# Patient Record
Sex: FEMALE | Race: WHITE | NOT HISPANIC OR LATINO | Employment: FULL TIME | URBAN - METROPOLITAN AREA
[De-identification: names, ages, dates, MRNs, and addresses within clinical notes are randomized per-mention and may not be internally consistent; named-entity substitution may affect disease eponyms.]

---

## 2021-06-17 ENCOUNTER — TELEPHONE (OUTPATIENT)
Dept: GASTROENTEROLOGY | Facility: CLINIC | Age: 66
End: 2021-06-17

## 2021-06-17 NOTE — TELEPHONE ENCOUNTER
When did symptoms start? About a year ago  Hx of GERD and stomach ulcers and c diff  Location of abdominal pain -   Center of abdomen and has serious HB every day                                    Quality - sharp, burning, dull, aching, cramping   - constant - knows it is there but can't really describe   actaully feels like someone is Squeezing my stomach    Severity - most days mild during day and gets worse toward night time  Pain Scale   -   6/10  Does pain occur before or after eating?  both  Does pain occur before or after BM?  no  Does pain occur evening before sleep? yes  Morning when waking? yes  Nocturnal - wakes you up at night after falling asleep? Sometimes    Does anything make pain better or worse? TUMS  Any specific triggers? Coffee does make it worse    Does palpation make pain worse? no  Abdominal distention?    no  Excessive flatus? yes  Stool consistency? Coughed the other day and stool poured out of her  Constipation or diarrhea?    both  Mucous in stool or blood in stool? No   Any  issues - dysuria, freqency, hematuria? no  Dietary habits - fluid intake probably okay,not sure about fiber    Current medications and effectiveness  TUMS, Pepcid one dose in morning  -- helpful but does not take symptoms away     Any other signs of illness -      Recent sick contacts   -   Works in                                              hospital      Nausea     sometimes     Vomiting    no     Fever    no     Chills     no     Pharyngitis/rash/fatigue    no         Pt aware she will not get call back until tomorrow

## 2021-06-17 NOTE — TELEPHONE ENCOUNTER
Spoke with patient who advises that she stomach pain daily  States PCP said she needed EGD at the beginning of the year  She was told by our office that she needed appt first which was scheduled in March and pt cancelled  She wants appt  I advised I would call PCP office to get recommendations from them before proceeding  I called PCP office and had to leave message

## 2021-06-17 NOTE — TELEPHONE ENCOUNTER
Last OV  10/2/2017  Last Colonoscopy  06/15/2018  Last Egd  10/17/2017    Pt had been referred by PCP for px EGD in March and had OV scheduled but had to cancel appt  States she is having pain and needs appt      [phone]

## 2021-06-17 NOTE — TELEPHONE ENCOUNTER
Received PCP note from 3/4/2021  Seen for chronic epigastric pain, burning worse over last six months

## 2021-06-18 NOTE — TELEPHONE ENCOUNTER
Spoke to patient, advised anti reflux diet and increase pepcid to 20mg BID for daily heatburn symptoms  She did not want PPI and also has history of C diff  She only having 1 stool a day which is loose no blood

## 2021-07-08 ENCOUNTER — PREP FOR PROCEDURE (OUTPATIENT)
Dept: GASTROENTEROLOGY | Facility: CLINIC | Age: 66
End: 2021-07-08

## 2021-07-08 ENCOUNTER — TELEPHONE (OUTPATIENT)
Dept: GASTROENTEROLOGY | Facility: CLINIC | Age: 66
End: 2021-07-08

## 2021-07-08 ENCOUNTER — OFFICE VISIT (OUTPATIENT)
Dept: GASTROENTEROLOGY | Facility: CLINIC | Age: 66
End: 2021-07-08
Payer: COMMERCIAL

## 2021-07-08 VITALS
SYSTOLIC BLOOD PRESSURE: 110 MMHG | DIASTOLIC BLOOD PRESSURE: 78 MMHG | HEART RATE: 53 BPM | HEIGHT: 64 IN | BODY MASS INDEX: 30.9 KG/M2 | WEIGHT: 181 LBS

## 2021-07-08 DIAGNOSIS — Z87.11 PERSONAL HISTORY OF PEPTIC ULCER DISEASE: ICD-10-CM

## 2021-07-08 DIAGNOSIS — Z86.010 PERSONAL HISTORY OF COLONIC POLYPS: ICD-10-CM

## 2021-07-08 DIAGNOSIS — Z85.3 HISTORY OF BREAST CANCER: ICD-10-CM

## 2021-07-08 DIAGNOSIS — R10.12 LEFT UPPER QUADRANT ABDOMINAL PAIN: ICD-10-CM

## 2021-07-08 DIAGNOSIS — R19.4 CHANGE IN BOWEL HABIT: Primary | ICD-10-CM

## 2021-07-08 DIAGNOSIS — R10.12 LEFT UPPER QUADRANT ABDOMINAL PAIN: Primary | ICD-10-CM

## 2021-07-08 DIAGNOSIS — K21.9 GASTROESOPHAGEAL REFLUX DISEASE WITHOUT ESOPHAGITIS: ICD-10-CM

## 2021-07-08 DIAGNOSIS — R19.4 CHANGE IN BOWEL HABIT: ICD-10-CM

## 2021-07-08 PROCEDURE — 99203 OFFICE O/P NEW LOW 30 MIN: CPT | Performed by: INTERNAL MEDICINE

## 2021-07-08 RX ORDER — PANTOPRAZOLE SODIUM 40 MG/1
40 TABLET, DELAYED RELEASE ORAL DAILY
Qty: 30 TABLET | Refills: 5 | Status: SHIPPED | OUTPATIENT
Start: 2021-07-08 | End: 2021-08-07

## 2021-07-08 RX ORDER — FAMOTIDINE 20 MG/1
20 TABLET, FILM COATED ORAL 2 TIMES DAILY
COMMUNITY

## 2021-07-08 RX ORDER — LETROZOLE 2.5 MG/1
2.5 TABLET, FILM COATED ORAL DAILY
COMMUNITY
Start: 2021-06-30

## 2021-07-08 RX ORDER — PROPRANOLOL HYDROCHLORIDE 120 MG/1
120 CAPSULE, EXTENDED RELEASE ORAL DAILY
COMMUNITY
Start: 2021-06-24

## 2021-07-08 RX ORDER — LISINOPRIL 20 MG/1
20 TABLET ORAL DAILY
COMMUNITY
Start: 2021-06-09

## 2021-07-08 RX ORDER — PAROXETINE HYDROCHLORIDE 20 MG/1
20 TABLET, FILM COATED ORAL
COMMUNITY

## 2021-07-08 NOTE — PROGRESS NOTES
[facility] - Outpatient Consultation  Varun Purdy 77 y o  female MRN: [id number]  Encounter: [id number]    ASSESSMENT AND PLAN:      1  Left upper quadrant abdominal pain  Patient with ongoing left-sided abdominal pain  Primarily in the left upper quadrant but some left lower quadrant pain as well  Patient does have associated nausea seems to be worse with eating  Differential could include peptic ulcer disease, atypical reflux- does report having had previous endoscopy- could consider pancreatic disease, could have functional abdominal pain   EGD at [facility]  - Comprehensive metabolic panel; Future  - CBC and differential; Future  - Occult Blood, Fecal Immunochemical; Future    - if EGD is negative would recommend CT scan abdomen and pelvis oral and IV contrast    2  Personal history of peptic ulcer disease  -- remote history of peptic ulcer disease  Can not exactly remember symptoms at that time she was 19   - if ulcer present should be  Covered with pantoprazole    3  Personal history of colonic polyps  -- did have colonoscopy in 2018 which was negative  Had polyps previously    4  Gastroesophageal reflux disease without esophagitis  -- presently on famotidine  Now advising to take famotidine 40 mg at night and pantoprazole in the morning which should cover her during the day  See if this helps with the pain  - pantoprazole (PROTONIX) 40 mg tablet; Take 1 tablet (40 mg total) by mouth daily  Dispense: 30 tablet; Refill: 5    5  History of breast cancer  --  Right breast diagnosed 2018  Had lumpectomy and lymph node dissection  Positive nodes  Did get radiation therapy no chemo presently on estrogen blockade  Follows with [facility]    6  Change in bowel habit  -- patient does report somewhat more frequent and more she type stools  Does not have any blood    In light of back she had previous polyps and this is a change will investigate  - colonoscopy at [facility]      Followup Appointment: 3 mo  ______________________________________________________________________    Chief Complaint   Patient presents with    Abdominal Pain    Nausea    Diarrhea       HPI:   Yoly Salas is a 77y o  year old female who presents with a history of left sided abdominal pain in the past few months  The pain in primarily located in the left upper quadrant  She has the pain nearly everyday  It may be associated with nausea and seems to be aggravated by food intake  The patient reports that she has been taking famotidine twice per day at this time and developed the pain while she has been on this medication  At times the patient does have some intermittent left sided abdominal pain  This is less troublesome to the patient  She has not had weight loss  She does have a remote history of peptic ulcer disease but she cannot exactly remember what her symptoms were at that time  Patient reports a recent change in her bowel habit  Stools are somewhat more frequent and irregular  There is no blood per rectum  She does did have a colonoscopy in 2018 and and no polyps were found at that time  Patient does have a remote history of breast cancer  She is somewhat terrified that she " has cancer in my body "  She had a lumpectomy and radiation therapy for node positive disease but she declined chemotherapy   She is on hormonal therapy at this time     Historical Information   Past Medical History:   Diagnosis Date    Anxiety     Breast cancer ([address] )     Hyperlipidemia     Hypertension      Past Surgical History:   Procedure Laterality Date    BREAST LUMPECTOMY      CHOLECYSTECTOMY      COLONOSCOPY      ERCP      FOOT SURGERY      LEG SURGERY      TONSILLECTOMY       Social History     Substance and Sexual Activity   Alcohol Use Yes    Comment: rarely     Social History     Substance and Sexual Activity   Drug Use Yes    Types: Marijuana     Social History     Tobacco Use   Smoking Status Former Smoker   Smokeless Tobacco Former User     Family History   Problem Relation Age of Onset    Heart disease Mother     Diabetes Mother     Alcohol abuse Father     Ulcerative colitis Brother     Heart Valve Disease Brother     Heart attack Brother     Clotting disorder Brother     Colon cancer Neg Hx        Meds/Allergies     Current Outpatient Medications:     famotidine (PEPCID) 20 mg tablet    letrozole (FEMARA) 2 5 mg tablet    lisinopril (ZESTRIL) 20 mg tablet    NON FORMULARY    PARoxetine (PAXIL) 20 mg tablet    propranolol (INDERAL LA) 120 mg 24 hr capsule    pantoprazole (PROTONIX) 40 mg tablet    Allergies   Allergen Reactions    Clindamycin Diarrhea     c-diff    Erythromycin Base Other (See Comments)     Abd pain    Metronidazole Swelling    Shellfish Allergy - Food Allergy Vomiting    Amoxicillin Rash       PHYSICAL EXAM:    Blood pressure 110/78, pulse (!) 53, height 5' 3 5" (1 613 m), weight 82 1 kg (181 lb)  Body mass index is 31 56 kg/m²  General Appearance: NAD, cooperative, alert  Eyes: Anicteric, conjunctiva is pink   ENT:  Normocephalic, atraumatic, normal mucosa  Neck:  Supple, symmetrical, trachea midline,   Resp:  Clear to auscultation bilaterally; no rales, rhonchi or wheezing; respirations unlabored   CV:  S1 S2, Regular rate and rhythm; no murmur, rub, or gallop  GI:  Soft, non-tender, non-distended; normal bowel sounds; no masses, no organomegaly   Rectal: Deferred  Musculoskeletal: No cyanosis, clubbing or edema  Normal ROM  Skin:  No jaundice, rashes, or lesions   Heme/Lymph: No palpable cervical lymphadenopathy  Psych: Normal affect, good eye contact  Neuro: No gross deficits, AAOx3      REVIEW OF SYSTEMS:    CONSTITUTIONAL: Denies any fever, chills, rigors, and weight loss  HEENT: No earache or tinnitus  Denies hearing loss or visual disturbances  CARDIOVASCULAR: No chest pain or palpitations     RESPIRATORY: Denies any cough, hemoptysis, shortness of breath or dyspnea on exertion  GASTROINTESTINAL: As noted in the History of Present Illness  GENITOURINARY: No problems with urination  Denies any hematuria or dysuria  NEUROLOGIC: No dizziness or vertigo, denies headaches  MUSCULOSKELETAL: Denies any muscle or joint pain  SKIN: Denies skin rashes or itching  ENDOCRINE: Denies excessive thirst  Denies intolerance to heat or cold  PSYCHOSOCIAL: Positive for some anxiety   Denies any recent memory loss

## 2021-07-08 NOTE — LETTER
July 12, 2021     Demi Leavitt DO  [address] [facility]    [facility] [address]    Patient: Otoniel Torre   YOB: 1955   Date of Visit: 7/8/2021       Dear Dr Robbins: Thank you for referring Otoniel Torre to me for evaluation  Below are my notes for this consultation  If you have questions, please do not hesitate to call me  I look forward to following your patient along with you  Sincerely,        Justyna Shelton MD        CC: No Recipients  Justyna Shelton MD  7/12/2021  8:17 AM  Incomplete    [address] [facility] - [facility]  Otoniel Torre 77 y o  female MRN: [id number]  Encounter: [id number]    ASSESSMENT AND PLAN:      1  Left upper quadrant abdominal pain  Patient with ongoing left-sided abdominal pain  Primarily in the left upper quadrant but some left lower quadrant pain as well  Patient does have associated nausea seems to be worse with eating  Differential could include peptic ulcer disease, atypical reflux- does report having had previous endoscopy- could consider pancreatic disease, could have functional abdominal pain   EGD at [facility]  - Comprehensive metabolic panel; Future  - CBC and differential; Future  - Occult Blood, Fecal Immunochemical; Future    - if EGD is negative would recommend CT scan abdomen and pelvis oral and IV contrast    2  Personal history of peptic ulcer disease  -- remote history of peptic ulcer disease  Can not exactly remember symptoms at that time she was 19   - if ulcer present should be  Covered with pantoprazole    3  Personal history of colonic polyps  -- did have colonoscopy in 2018 which was negative  Had polyps previously    4  Gastroesophageal reflux disease without esophagitis  -- presently on famotidine  Now advising to take famotidine 40 mg at night and pantoprazole in the morning which should cover her during the day  See if this helps with the pain  - pantoprazole (PROTONIX) 40 mg tablet;  Take 1 tablet (40 mg total) by mouth daily  Dispense: 30 tablet; Refill: 5    5  History of breast cancer  --  Right breast diagnosed 2018  Had lumpectomy and lymph node dissection  Positive nodes  Did get radiation therapy no chemo presently on estrogen blockade  Follows with [facility]    6  Change in bowel habit  -- patient does report somewhat more frequent and more she type stools  Does not have any blood  In light of back she had previous polyps and this is a change will investigate  - colonoscopy at [facility]      Followup Appointment: 3 mo  ______________________________________________________________________    Chief Complaint   Patient presents with    Abdominal Pain    Nausea    Diarrhea       HPI:   Daisy Gutiérrez is a 77y o  year old female who presents with a history of left sided abdominal pain in the past few months  The pain in primarily located in the left upper quadrant  She has the pain nearly everyday  It may be associated with nausea and seems to be aggravated by food intake  The patient reports that she has been taking famotidine twice per day at this time and developed the pain while she has been on this medication  At times the patient does have some intermittent left sided abdominal pain  This is less troublesome to the patient  She has not had weight loss  She does have a remote history of peptic ulcer disease but she cannot exactly remember what her symptoms were at that time  Patient reports a recent change in her bowel habit  Stools are somewhat more frequent and irregular  There is no blood per rectum  She does did have a colonoscopy in 2018 and and no polyps were found at that time  Patient does have a remote history of breast cancer  She is somewhat terrified that she " has cancer in my body "  She had a lumpectomy and radiation therapy for node positive disease but she declined chemotherapy   She is on hormonal therapy at this time     Historical Information   Past Medical History:   Diagnosis Date    Anxiety     Breast cancer [address] [address]     Hyperlipidemia     Hypertension      Past Surgical History:   Procedure Laterality Date    BREAST LUMPECTOMY      CHOLECYSTECTOMY      COLONOSCOPY      ERCP      FOOT SURGERY      LEG SURGERY      TONSILLECTOMY       Social History     Substance and Sexual Activity   Alcohol Use Yes    Comment: rarely     Social History     Substance and Sexual Activity   Drug Use Yes    Types: Marijuana     Social History     Tobacco Use   Smoking Status Former Smoker   Smokeless Tobacco Former User     Family History   Problem Relation Age of Onset    Heart disease Mother     Diabetes Mother     Alcohol abuse Father     Ulcerative colitis Brother     Heart Valve Disease Brother     Heart attack Brother     Clotting disorder Brother     Colon cancer Neg Hx        Meds/Allergies     Current Outpatient Medications:     famotidine (PEPCID) 20 mg tablet    letrozole (FEMARA) 2 5 mg tablet    lisinopril (ZESTRIL) 20 mg tablet    NON FORMULARY    PARoxetine (PAXIL) 20 mg tablet    propranolol (INDERAL LA) 120 mg 24 hr capsule    pantoprazole (PROTONIX) 40 mg tablet    Allergies   Allergen Reactions    Clindamycin Diarrhea     c-diff    Erythromycin Base Other (See Comments)     Abd pain    Metronidazole Swelling    Shellfish Allergy - Food Allergy Vomiting    Amoxicillin Rash       PHYSICAL EXAM:    Blood pressure 110/78, pulse (!) 53, height 5' 3 5" (1 613 m), weight 82 1 kg (181 lb)  Body mass index is 31 56 kg/m²  General Appearance: NAD, cooperative, alert  Eyes: Anicteric, conjunctiva is pink   ENT:  Normocephalic, atraumatic, normal mucosa  Neck:  Supple, symmetrical, trachea midline,   Resp:  Clear to auscultation bilaterally; no rales, rhonchi or wheezing; respirations unlabored   CV:  S1 S2, Regular rate and rhythm; no murmur, rub, or gallop    GI:  Soft, non-tender, non-distended; normal bowel sounds; no masses, no organomegaly Rectal: Deferred  Musculoskeletal: No cyanosis, clubbing or edema  Normal ROM  Skin:  No jaundice, rashes, or lesions   Heme/Lymph: No palpable cervical lymphadenopathy  Psych: Normal affect, good eye contact  Neuro: No gross deficits, AAOx3      REVIEW OF SYSTEMS:    CONSTITUTIONAL: Denies any fever, chills, rigors, and weight loss  HEENT: No earache or tinnitus  Denies hearing loss or visual disturbances  CARDIOVASCULAR: No chest pain or palpitations  RESPIRATORY: Denies any cough, hemoptysis, shortness of breath or dyspnea on exertion  GASTROINTESTINAL: As noted in the History of Present Illness  GENITOURINARY: No problems with urination  Denies any hematuria or dysuria  NEUROLOGIC: No dizziness or vertigo, denies headaches  MUSCULOSKELETAL: Denies any muscle or joint pain  SKIN: Denies skin rashes or itching  ENDOCRINE: Denies excessive thirst  Denies intolerance to heat or cold  PSYCHOSOCIAL: Positive for some anxiety   Denies any recent memory loss  Clarence Terrell MD  7/12/2021  8:03 AM  Sign when Signing Visit    [facility] - Outpatient Consultation  [facility] Neris 77 y o  female MRN: [id number]  Encounter: [id number]    ASSESSMENT AND PLAN:      1  Left upper quadrant abdominal pain  Patient with ongoing left-sided abdominal pain  Primarily in the left upper quadrant but some left lower quadrant pain as well  Patient does have associated nausea seems to be worse with eating  Differential could include peptic ulcer disease, atypical reflux- does report having had previous endoscopy- could consider pancreatic disease  EGD at [facility]  - Comprehensive metabolic panel; Future  - CBC and differential; Future  - Occult Blood, Fecal Immunochemical; Future    - if EGD is negative would recommend CT scan abdomen and pelvis oral and IV contrast    2  Personal history of peptic ulcer disease  -- remote history of peptic ulcer disease    Can not exactly remember symptoms at that time she was 19   - if ulcer present should be  Covered with pantoprazole    3  Personal history of colonic polyps  -- did have colonoscopy in 2018 which was negative  Had polyps previously    4  Gastroesophageal reflux disease without esophagitis  -- presently on famotidine  Now advising to take famotidine 40 mg at night and pantoprazole in the morning which should cover her during the day  See if this helps with the pain  - pantoprazole (PROTONIX) 40 mg tablet; Take 1 tablet (40 mg total) by mouth daily  Dispense: 30 tablet; Refill: 5    5  History of breast cancer  --  Right breast diagnosed 2018  Had lumpectomy and lymph node dissection  Positive nodes  Did get radiation therapy no chemo presently on estrogen blockade  Follows with [facility]    6  Change in bowel habit  -- patient does report somewhat more frequent and more she type stools  Does not have any blood  In light of back she had previous polyps and this is a change will investigate  - colonoscopy at [facility]      Followup Appointment: 3 mo  ______________________________________________________________________    Chief Complaint   Patient presents with    Abdominal Pain    Nausea    Diarrhea       HPI:   Varun Purdy is a 77y o  year old female who presents with a history of left sided abdominal pain in the past few months  The pain in primarily located in the left upper quadrant  She has the pain nearly everyday  It may be associated with nausea and seems to be aggravated by food intake  The patient reports that she has been taking famotidine twice per day at this time and developed the pain while she has been on this medication  At times the patient does have some intermittent left sided abdominal pain  This is less troublesome to the patient  Patient reports a recent change in her bowel habit  Stools are somewhat more frequent and irregular  There is no blood per rectum   He did have a colonoscopy Historical Information   Past Medical History:   Diagnosis Date    Anxiety     Breast cancer [address] [address]     Hyperlipidemia     Hypertension      Past Surgical History:   Procedure Laterality Date    BREAST LUMPECTOMY      CHOLECYSTECTOMY      COLONOSCOPY      ERCP      FOOT SURGERY      LEG SURGERY      TONSILLECTOMY       Social History     Substance and Sexual Activity   Alcohol Use Yes    Comment: rarely     Social History     Substance and Sexual Activity   Drug Use Yes    Types: Marijuana     Social History     Tobacco Use   Smoking Status Former Smoker   Smokeless Tobacco Former User     Family History   Problem Relation Age of Onset    Heart disease Mother     Diabetes Mother     Alcohol abuse Father     Ulcerative colitis Brother     Heart Valve Disease Brother     Heart attack Brother     Clotting disorder Brother     Colon cancer Neg Hx        Meds/Allergies     Current Outpatient Medications:     famotidine (PEPCID) 20 mg tablet    letrozole (FEMARA) 2 5 mg tablet    lisinopril (ZESTRIL) 20 mg tablet    NON FORMULARY    PARoxetine (PAXIL) 20 mg tablet    propranolol (INDERAL LA) 120 mg 24 hr capsule    pantoprazole (PROTONIX) 40 mg tablet    Allergies   Allergen Reactions    Clindamycin Diarrhea     c-diff    Erythromycin Base Other (See Comments)     Abd pain    Metronidazole Swelling    Shellfish Allergy - Food Allergy Vomiting    Amoxicillin Rash       PHYSICAL EXAM:    Blood pressure 110/78, pulse (!) 53, height 5' 3 5" (1 613 m), weight 82 1 kg (181 lb)  Body mass index is 31 56 kg/m²  General Appearance: NAD, cooperative, alert  Eyes: Anicteric, PERRLA, EOMI  ENT:  Normocephalic, atraumatic, normal mucosa  Neck:  Supple, symmetrical, trachea midline,   Resp:  Clear to auscultation bilaterally; no rales, rhonchi or wheezing; respirations unlabored   CV:  S1 S2, Regular rate and rhythm; no murmur, rub, or gallop    GI:  Soft, non-tender, non-distended; normal bowel sounds; no masses, no organomegaly   Rectal: Deferred  Musculoskeletal: No cyanosis, clubbing or edema  Normal ROM  Skin:  No jaundice, rashes, or lesions   Heme/Lymph: No palpable cervical lymphadenopathy  Psych: Normal affect, good eye contact  Neuro: No gross deficits, AAOx3      REVIEW OF SYSTEMS:    CONSTITUTIONAL: Denies any fever, chills, rigors, and weight loss  HEENT: No earache or tinnitus  Denies hearing loss or visual disturbances  CARDIOVASCULAR: No chest pain or palpitations  RESPIRATORY: Denies any cough, hemoptysis, shortness of breath or dyspnea on exertion  GASTROINTESTINAL: As noted in the History of Present Illness  GENITOURINARY: No problems with urination  Denies any hematuria or dysuria  NEUROLOGIC: No dizziness or vertigo, denies headaches  MUSCULOSKELETAL: Denies any muscle or joint pain  SKIN: Denies skin rashes or itching  ENDOCRINE: Denies excessive thirst  Denies intolerance to heat or cold  PSYCHOSOCIAL: Denies depression or anxiety  Denies any recent memory loss

## 2021-07-08 NOTE — PATIENT INSTRUCTIONS
[facility] - Outpatient Consultation  Radha Hanna 77 y o  female MRN: [id number]  Encounter: [id number]    ASSESSMENT AND PLAN:      1  Left upper quadrant abdominal pain  Patient with ongoing left-sided abdominal pain  Primarily in the left upper quadrant but some left lower quadrant pain as well  Patient does have associated nausea seems to be worse with eating  Differential could include peptic ulcer disease, atypical reflux- does report having had previous endoscopy- could consider pancreatic disease, patient could have functional abdominal pain   EGD at [facility]  - Comprehensive metabolic panel; Future  - CBC and differential; Future  - Occult Blood, Fecal Immunochemical; Future    - if EGD is negative would recommend CT scan abdomen and pelvis oral and IV contrast    2  Personal history of peptic ulcer disease  -- remote history of peptic ulcer disease  Can not exactly remember symptoms at that time she was 19   - if ulcer present should be  Covered with pantoprazole    3  Personal history of colonic polyps  -- did have colonoscopy in 2018 which was negative  Had polyps previously    4  Gastroesophageal reflux disease without esophagitis  -- presently on famotidine  Now advising to take famotidine 40 mg at night and pantoprazole in the morning which should cover her during the day  See if this helps with the pain  - pantoprazole (PROTONIX) 40 mg tablet; Take 1 tablet (40 mg total) by mouth daily  Dispense: 30 tablet; Refill: 5    5  History of breast cancer  --  Right breast diagnosed 2018  Had lumpectomy and lymph node dissection  Positive nodes  Did get radiation therapy no chemo presently on estrogen blockade  Follows with [facility]    6  Change in bowel habit  -- patient does report somewhat more frequent and more she type stools  Does not have any blood    In light of back she had previous polyps and this is a change will investigate  - colonoscopy at [facility]      Followup Appointment: 3 mo

## 2021-07-08 NOTE — TELEPHONE ENCOUNTER
Pt is scheduled for COMBO at [address] with Vilinda Cheadle on 8/25/21  Pt would like prep done closer to procedure, so pt will get a phone call on 8/11/21 to go over prep instructions  Pt was ordered stool studies

## 2021-07-12 PROBLEM — Z87.11 PERSONAL HISTORY OF PEPTIC ULCER DISEASE: Status: ACTIVE | Noted: 2021-07-12

## 2021-07-12 PROBLEM — R19.4 CHANGE IN BOWEL HABIT: Status: ACTIVE | Noted: 2021-07-12

## 2021-07-12 PROBLEM — R10.12 LEFT UPPER QUADRANT ABDOMINAL PAIN: Status: ACTIVE | Noted: 2021-07-12

## 2021-08-16 VITALS — HEIGHT: 63 IN | BODY MASS INDEX: 31.89 KG/M2 | WEIGHT: 180 LBS

## 2021-08-16 DIAGNOSIS — R10.12 LEFT UPPER QUADRANT ABDOMINAL PAIN: Primary | ICD-10-CM

## 2021-08-16 RX ORDER — SODIUM PICOSULFATE, MAGNESIUM OXIDE, AND ANHYDROUS CITRIC ACID 10; 3.5; 12 MG/160ML; G/160ML; G/160ML
LIQUID ORAL
Qty: 320 ML | Refills: 0 | Status: SHIPPED | OUTPATIENT
Start: 2021-08-16 | End: 2021-08-25 | Stop reason: HOSPADM

## 2021-08-24 ENCOUNTER — TELEPHONE (OUTPATIENT)
Dept: SURGERY | Facility: HOSPITAL | Age: 66
End: 2021-08-24

## 2021-08-24 LAB
ALBUMIN SERPL-MCNC: 4.3 G/DL (ref 3.8–4.8)
ALBUMIN/GLOB SERPL: 1.9 {RATIO} (ref 1.2–2.2)
ALP SERPL-CCNC: 83 IU/L (ref 48–121)
ALT SERPL-CCNC: 30 IU/L (ref 0–32)
AST SERPL-CCNC: 26 IU/L (ref 0–40)
BASOPHILS # BLD AUTO: 0.1 X10E3/UL (ref 0–0.2)
BASOPHILS NFR BLD AUTO: 1 %
BILIRUB SERPL-MCNC: 0.2 MG/DL (ref 0–1.2)
BUN SERPL-MCNC: 14 MG/DL (ref 8–27)
BUN/CREAT SERPL: 12 (ref 12–28)
CALCIUM SERPL-MCNC: 9.6 MG/DL (ref 8.7–10.3)
CHLORIDE SERPL-SCNC: 102 MMOL/L (ref 96–106)
CO2 SERPL-SCNC: 23 MMOL/L (ref 20–29)
CREAT SERPL-MCNC: 1.2 MG/DL (ref 0.57–1)
EOSINOPHIL # BLD AUTO: 0.5 X10E3/UL (ref 0–0.4)
EOSINOPHIL NFR BLD AUTO: 6 %
ERYTHROCYTE [DISTWIDTH] IN BLOOD BY AUTOMATED COUNT: 13.3 % (ref 11.7–15.4)
GLOBULIN SER-MCNC: 2.3 G/DL (ref 1.5–4.5)
GLUCOSE SERPL-MCNC: 121 MG/DL (ref 65–99)
HCT VFR BLD AUTO: 42.7 % (ref 34–46.6)
HEMOCCULT STL QL IA: NEGATIVE
HGB BLD-MCNC: 14.3 G/DL (ref 11.1–15.9)
IMM GRANULOCYTES # BLD: 0 X10E3/UL (ref 0–0.1)
IMM GRANULOCYTES NFR BLD: 0 %
LYMPHOCYTES # BLD AUTO: 2.1 X10E3/UL (ref 0.7–3.1)
LYMPHOCYTES NFR BLD AUTO: 28 %
MCH RBC QN AUTO: 32.1 PG (ref 26.6–33)
MCHC RBC AUTO-ENTMCNC: 33.5 G/DL (ref 31.5–35.7)
MCV RBC AUTO: 96 FL (ref 79–97)
MONOCYTES # BLD AUTO: 0.5 X10E3/UL (ref 0.1–0.9)
MONOCYTES NFR BLD AUTO: 7 %
NEUTROPHILS # BLD AUTO: 4.3 X10E3/UL (ref 1.4–7)
NEUTROPHILS NFR BLD AUTO: 58 %
PLATELET # BLD AUTO: 307 X10E3/UL (ref 150–450)
POTASSIUM SERPL-SCNC: 4.3 MMOL/L (ref 3.5–5.2)
PROT SERPL-MCNC: 6.6 G/DL (ref 6–8.5)
RBC # BLD AUTO: 4.46 X10E6/UL (ref 3.77–5.28)
SL AMB EGFR AFRICAN AMERICAN: 54 ML/MIN/1.73
SL AMB EGFR NON AFRICAN AMERICAN: 47 ML/MIN/1.73
SODIUM SERPL-SCNC: 137 MMOL/L (ref 134–144)
WBC # BLD AUTO: 7.6 X10E3/UL (ref 3.4–10.8)

## 2021-08-25 ENCOUNTER — ANESTHESIA (OUTPATIENT)
Dept: GASTROENTEROLOGY | Facility: HOSPITAL | Age: 66
End: 2021-08-25

## 2021-08-25 ENCOUNTER — ANESTHESIA EVENT (OUTPATIENT)
Dept: GASTROENTEROLOGY | Facility: HOSPITAL | Age: 66
End: 2021-08-25

## 2021-08-25 ENCOUNTER — HOSPITAL ENCOUNTER (OUTPATIENT)
Dept: GASTROENTEROLOGY | Facility: HOSPITAL | Age: 66
Setting detail: OUTPATIENT SURGERY
Discharge: HOME | End: 2021-08-25
Attending: INTERNAL MEDICINE | Admitting: INTERNAL MEDICINE
Payer: COMMERCIAL

## 2021-08-25 ENCOUNTER — TELEPHONE (OUTPATIENT)
Dept: SURGERY | Facility: HOSPITAL | Age: 66
End: 2021-08-25

## 2021-08-25 VITALS
OXYGEN SATURATION: 96 % | HEART RATE: 58 BPM | SYSTOLIC BLOOD PRESSURE: 142 MMHG | RESPIRATION RATE: 18 BRPM | TEMPERATURE: 97.8 F | DIASTOLIC BLOOD PRESSURE: 72 MMHG

## 2021-08-25 DIAGNOSIS — R10.12 LEFT UPPER QUADRANT ABDOMINAL PAIN: ICD-10-CM

## 2021-08-25 DIAGNOSIS — R19.4 CHANGE IN BOWEL HABIT: ICD-10-CM

## 2021-08-25 PROCEDURE — 43239 EGD BIOPSY SINGLE/MULTIPLE: CPT | Performed by: INTERNAL MEDICINE

## 2021-08-25 PROCEDURE — 45380 COLONOSCOPY AND BIOPSY: CPT | Performed by: INTERNAL MEDICINE

## 2021-08-25 PROCEDURE — 88305 TISSUE EXAM BY PATHOLOGIST: CPT | Performed by: PATHOLOGY

## 2021-08-25 RX ORDER — PROPOFOL 10 MG/ML
INJECTION, EMULSION INTRAVENOUS AS NEEDED
Status: DISCONTINUED | OUTPATIENT
Start: 2021-08-25 | End: 2021-08-25

## 2021-08-25 RX ORDER — FENTANYL CITRATE 50 UG/ML
INJECTION, SOLUTION INTRAMUSCULAR; INTRAVENOUS AS NEEDED
Status: DISCONTINUED | OUTPATIENT
Start: 2021-08-25 | End: 2021-08-25

## 2021-08-25 RX ORDER — LIDOCAINE HYDROCHLORIDE 10 MG/ML
INJECTION, SOLUTION EPIDURAL; INFILTRATION; INTRACAUDAL; PERINEURAL AS NEEDED
Status: DISCONTINUED | OUTPATIENT
Start: 2021-08-25 | End: 2021-08-25

## 2021-08-25 RX ORDER — SODIUM CHLORIDE 9 MG/ML
INJECTION, SOLUTION INTRAVENOUS CONTINUOUS PRN
Status: DISCONTINUED | OUTPATIENT
Start: 2021-08-25 | End: 2021-08-25

## 2021-08-25 RX ADMIN — PROPOFOL 30 MG: 10 INJECTION, EMULSION INTRAVENOUS at 10:03

## 2021-08-25 RX ADMIN — PROPOFOL 30 MG: 10 INJECTION, EMULSION INTRAVENOUS at 10:12

## 2021-08-25 RX ADMIN — PROPOFOL 20 MG: 10 INJECTION, EMULSION INTRAVENOUS at 09:53

## 2021-08-25 RX ADMIN — PROPOFOL 30 MG: 10 INJECTION, EMULSION INTRAVENOUS at 09:57

## 2021-08-25 RX ADMIN — FENTANYL CITRATE 25 MCG: 50 INJECTION, SOLUTION INTRAMUSCULAR; INTRAVENOUS at 10:06

## 2021-08-25 RX ADMIN — LIDOCAINE HYDROCHLORIDE 100 MG: 10 INJECTION, SOLUTION EPIDURAL; INFILTRATION; INTRACAUDAL; PERINEURAL at 09:47

## 2021-08-25 RX ADMIN — PROPOFOL 30 MG: 10 INJECTION, EMULSION INTRAVENOUS at 09:59

## 2021-08-25 RX ADMIN — PROPOFOL 20 MG: 10 INJECTION, EMULSION INTRAVENOUS at 09:50

## 2021-08-25 RX ADMIN — PROPOFOL 50 MG: 10 INJECTION, EMULSION INTRAVENOUS at 10:05

## 2021-08-25 RX ADMIN — PROPOFOL 130 MG: 10 INJECTION, EMULSION INTRAVENOUS at 09:47

## 2021-08-25 RX ADMIN — FENTANYL CITRATE 50 MCG: 50 INJECTION, SOLUTION INTRAMUSCULAR; INTRAVENOUS at 09:40

## 2021-08-25 RX ADMIN — SODIUM CHLORIDE: 9 INJECTION, SOLUTION INTRAVENOUS at 09:24

## 2021-08-25 RX ADMIN — FENTANYL CITRATE 25 MCG: 50 INJECTION, SOLUTION INTRAMUSCULAR; INTRAVENOUS at 10:00

## 2021-08-25 RX ADMIN — PROPOFOL 30 MG: 10 INJECTION, EMULSION INTRAVENOUS at 10:07

## 2021-08-25 RX ADMIN — PROPOFOL 50 MG: 10 INJECTION, EMULSION INTRAVENOUS at 10:15

## 2021-08-25 NOTE — H&P
History and Physical - [facility]  Sajan Menard 77 y o  female MRN: [id number]    HPI: Sajan Menard is a 77y o  year old female who presents for EGD and colonoscopy  She does have a history of left upper quadrant pain GERD and history of peptic ulcer disease  She is on acid suppression therapy  In addition she has had recent change in bowel habits increasing stool frequency  She does have a history of breast cancer and is concerned about metastatic disease    REVIEW OF SYSTEMS: Per the HPI, and otherwise unremarkable      Historical Information   Past Medical History:   Diagnosis Date    Anxiety     Breast cancer ([address] )     Hyperlipidemia     Hypertension      Past Surgical History:   Procedure Laterality Date    BREAST LUMPECTOMY      CHOLECYSTECTOMY      COLONOSCOPY      ERCP      FOOT SURGERY      LEG SURGERY      TONSILLECTOMY       Social History   Social History     Substance and Sexual Activity   Alcohol Use Yes    Comment: rarely     Social History     Substance and Sexual Activity   Drug Use Yes    Types: Marijuana     Social History     Tobacco Use   Smoking Status Former Smoker   Smokeless Tobacco Former User     Family History   Problem Relation Age of Onset    Heart disease Mother     Diabetes Mother     Alcohol abuse Father     Ulcerative colitis Brother     Heart Valve Disease Brother     Heart attack Brother     Clotting disorder Brother     Colon cancer Neg Hx        Meds/Allergies       Current Outpatient Medications:     famotidine (PEPCID) 20 mg tablet    letrozole (FEMARA) 2 5 mg tablet    lisinopril (ZESTRIL) 20 mg tablet    PARoxetine (PAXIL) 20 mg tablet    propranolol (INDERAL LA) 120 mg 24 hr capsule    Sod Picosulfate-Mag Ox-Cit Acd (Clenpiq) 10-3 5-12 MG-GM -GM/160ML SOLN    NON FORMULARY    pantoprazole (PROTONIX) 40 mg tablet    Allergies   Allergen Reactions    Clindamycin Diarrhea     c-diff    Erythromycin Base Other (See Comments)     Abd pain    Metronidazole Swelling    Shellfish Allergy - Food Allergy Vomiting    Amoxicillin Rash       Objective     BP (!) 192/84   Pulse (!) 54   Temp (!) 96 8 °F (36 °C) (Temporal)   Resp 20   SpO2 98%     PHYSICAL EXAM    Gen: NAD AAOx3  Head: Normocephalic, Atraumatic  CV: S1S2 RRR no m/r/g  CHEST: Clear b/l no c/r/w  ABD: soft, +BS NT/ND  EXT: no edema    ASSESSMENT/PLAN:  This is a 77y o  year old female here for diagnostic EGD and colonoscopy, and she is stable and optimized for her procedure

## 2021-08-25 NOTE — ANESTHESIA PREPROCEDURE EVALUATION
Procedure:  COLONOSCOPY  EGD    Relevant Problems   ANESTHESIA (within normal limits)      NEURO/PSYCH   (+) Personal history of peptic ulcer disease      Other   (+) Left upper quadrant abdominal pain      Past Medical History:   Diagnosis Date    Anxiety     Breast cancer [address] [address]     Hyperlipidemia     Hypertension          Physical Exam    Airway    Mallampati score: I  TM Distance: >3 FB  Neck ROM: full     Dental   No notable dental hx     Cardiovascular  Cardiovascular exam normal    Pulmonary  Pulmonary exam normal     Other Findings        Anesthesia Plan  ASA Score- 2     Anesthesia Type- IV sedation with anesthesia with ASA Monitors  Additional Monitors:   Airway Plan:     Comment: I discussed risks (reviewed with patient on the anesthesia consent form), benefits and alternatives of monitored sedation including the possibility under sedation to have recall or mild discomfort          Plan Factors-    Chart reviewed  Patient summary reviewed  Induction- intravenous  Postoperative Plan-     Informed Consent- Anesthetic plan and risks discussed with patient  I personally reviewed this patient with the CRNA  Discussed and agreed on the Anesthesia Plan with the CRNA  Rosella Goldmann

## 2021-08-25 NOTE — ANESTHESIA POSTPROCEDURE EVALUATION
Post-Op Assessment Note    CV Status:  Stable  Pain Score: 0    Pain management: adequate     Mental Status:  Awake and alert   Hydration Status:  Stable   PONV Controlled:  None   Airway Patency:  Patent and adequate      Post Op Vitals Reviewed: Yes      Staff: CRNA, Anesthesiologist         No complications documented      BP  122/61   Temp     Pulse 55   Resp   16   SpO2   99%

## 2021-09-06 NOTE — RESULT ENCOUNTER NOTE
I called the patient discussed results  EGD biopsies negative for celiac or H pylori  Colon biopsies patient did have 2 small adenomas  Recall colonoscopy for 5 years  Patient did have some mild lymphocytic colitis  Diarrhea isn't too bad  She is not interested having medicine this time  Advise Imodium 1-2 tablets daily  If diarrhea gets worse then she could call and would prescribe budesonide  Patient did have some left upper quadrant abdominal pain  Worse with bending over  Did offer further investigation with CT scan  Patient reports she wants to hold off for now  Will call symptoms get worse  Have patient come back to the office in about 3 months  Thank you  Copy to the patient's PCP

## 2023-01-31 ENCOUNTER — OFFICE VISIT (OUTPATIENT)
Dept: URGENT CARE | Facility: CLINIC | Age: 68
End: 2023-01-31

## 2023-01-31 VITALS
RESPIRATION RATE: 20 BRPM | OXYGEN SATURATION: 96 % | BODY MASS INDEX: 31.01 KG/M2 | SYSTOLIC BLOOD PRESSURE: 156 MMHG | TEMPERATURE: 102.9 F | WEIGHT: 175 LBS | DIASTOLIC BLOOD PRESSURE: 98 MMHG | HEIGHT: 63 IN | HEART RATE: 73 BPM

## 2023-01-31 DIAGNOSIS — U07.1 COVID: ICD-10-CM

## 2023-01-31 DIAGNOSIS — R50.9 FEVER, UNSPECIFIED FEVER CAUSE: Primary | ICD-10-CM

## 2023-01-31 LAB
S PYO AG THROAT QL: NEGATIVE
SARS-COV-2 AG UPPER RESP QL IA: POSITIVE
VALID CONTROL: ABNORMAL

## 2023-01-31 RX ORDER — ACETAMINOPHEN 325 MG/1
650 TABLET ORAL ONCE
Status: COMPLETED | OUTPATIENT
Start: 2023-01-31 | End: 2023-01-31

## 2023-01-31 RX ORDER — NIRMATRELVIR AND RITONAVIR 150-100 MG
2 KIT ORAL 2 TIMES DAILY
Qty: 20 TABLET | Refills: 0 | Status: SHIPPED | OUTPATIENT
Start: 2023-01-31 | End: 2023-02-05

## 2023-01-31 RX ORDER — OMEPRAZOLE 10 MG/1
10 CAPSULE, DELAYED RELEASE ORAL DAILY
COMMUNITY

## 2023-01-31 RX ADMIN — ACETAMINOPHEN 650 MG: 325 TABLET ORAL at 16:31

## 2023-01-31 NOTE — PROGRESS NOTES
[facility]        NAME: Daisha De Dios is a 76 y o  female  : 1955    MRN: [id number]  DATE: 2023  TIME: 7:05 PM    /98   Pulse 73   Temp (!) 102 9 °F (39 4 °C)   Resp 20   Ht 5' 3" (1 6 m)   Wt 79 4 kg (175 lb)   SpO2 96%   BMI 31 00 kg/m²     Assessment and Plan   Fever, unspecified fever cause [R50 9]  1  Fever, unspecified fever cause  Poct Covid 19 Rapid Antigen Test    POCT rapid strepA    acetaminophen (TYLENOL) tablet 650 mg      2  COVID  nirmatrelvir & ritonavir (Paxlovid, 150/100,) tablet therapy pack    nirmatrelvir & ritonavir (Paxlovid, 150/100,) tablet therapy pack            Patient Instructions       Follow up with PCP in 3-5 days  Proceed to  ER if symptoms worsen  Chief Complaint     Chief Complaint   Patient presents with   • Fever     Pt reports fever, cold like symptoms, productive cough with yellow mucus, and left ear pain with onset of symptoms one day ago  Denies testing at home for Covid  Dx COPD  Managing symptoms at home with otc cold and flu  History of Present Illness       Pt with fever  Cough body aches nasal d/c  Fever for several days      Review of Systems   Review of Systems   Constitutional: Positive for fatigue and fever  HENT: Positive for congestion and sore throat  Eyes: Negative  Respiratory: Positive for cough  Cardiovascular: Negative  Gastrointestinal: Negative  Endocrine: Negative  Genitourinary: Negative  Musculoskeletal: Negative  Skin: Negative  Allergic/Immunologic: Negative  Neurological: Negative  Hematological: Negative  Psychiatric/Behavioral: Negative  All other systems reviewed and are negative          Current Medications       Current Outpatient Medications:   •  letrozole (FEMARA) 2 5 mg tablet, Take 2 5 mg by mouth daily, Disp: , Rfl:   •  lisinopril (ZESTRIL) 20 mg tablet, Take 20 mg by mouth daily, Disp: , Rfl:   •  nirmatrelvir & ritonavir (Paxlovid, 150/100,) tablet therapy pack, Take 2 tablets by mouth 2 (two) times a day for 5 days Take 1 nirmatrelvir tablet + 1 ritonavir tablet together per dose, Disp: 20 tablet, Rfl: 0  •  nirmatrelvir & ritonavir (Paxlovid, 150/100,) tablet therapy pack, Take 2 tablets by mouth 2 (two) times a day for 5 days Take 1 nirmatrelvir tablet + 1 ritonavir tablet together per dose, Disp: 20 tablet, Rfl: 0  •  NON FORMULARY, Medical marijuana, Disp: , Rfl:   •  omeprazole (PriLOSEC) 10 mg delayed release capsule, Take 10 mg by mouth daily, Disp: , Rfl:   •  PARoxetine (PAXIL) 20 mg tablet, Take 20 mg by mouth, Disp: , Rfl:   •  propranolol (INDERAL LA) 120 mg 24 hr capsule, Take 120 mg by mouth daily, Disp: , Rfl:   •  famotidine (PEPCID) 20 mg tablet, Take 20 mg by mouth 2 (two) times a day (Patient not taking: Reported on 1/31/2023), Disp: , Rfl:   •  pantoprazole (PROTONIX) 40 mg tablet, Take 1 tablet (40 mg total) by mouth daily, Disp: 30 tablet, Rfl: 5  No current facility-administered medications for this visit      Current Allergies     Allergies as of 01/31/2023 - Reviewed 01/31/2023   Allergen Reaction Noted   • Clindamycin Diarrhea 04/24/2020   • Erythromycin base Other (See Comments) 04/24/2020   • Metronidazole Swelling 04/24/2020   • Shellfish allergy - food allergy Vomiting 04/24/2020   • Amoxicillin Rash 04/24/2020            The following portions of the patient's history were reviewed and updated as appropriate: allergies, current medications, past family history, past medical history, past social history, past surgical history and problem list      Past Medical History:   Diagnosis Date   • Anxiety    • Breast cancer ([address] )    • Hyperlipidemia    • Hypertension        Past Surgical History:   Procedure Laterality Date   • BREAST LUMPECTOMY     • CHOLECYSTECTOMY     • COLONOSCOPY     • ERCP     • FOOT SURGERY     • LEG SURGERY     • TONSILLECTOMY         Family History   Problem Relation Age of Onset   • Heart disease Mother • Diabetes Mother    • Alcohol abuse Father    • Ulcerative colitis Brother    • Heart Valve Disease Brother    • Heart attack Brother    • Clotting disorder Brother    • Colon cancer Neg Hx          Medications have been verified  Objective   /98   Pulse 73   Temp (!) 102 9 °F (39 4 °C)   Resp 20   Ht 5' 3" (1 6 m)   Wt 79 4 kg (175 lb)   SpO2 96%   BMI 31 00 kg/m²        Physical Exam     Physical Exam  Vitals and nursing note reviewed  Constitutional:       Appearance: Normal appearance  She is normal weight  Comments: Pt declines er evaluation    HENT:      Head: Normocephalic and atraumatic  Right Ear: Tympanic membrane, ear canal and external ear normal       Left Ear: Tympanic membrane, ear canal and external ear normal       Nose: Congestion and rhinorrhea present  Comments: Clear d/c   Eyes:      Extraocular Movements: Extraocular movements intact  Conjunctiva/sclera: Conjunctivae normal       Pupils: Pupils are equal, round, and reactive to light  Cardiovascular:      Rate and Rhythm: Normal rate and regular rhythm  Pulses: Normal pulses  Heart sounds: Normal heart sounds  Pulmonary:      Effort: Pulmonary effort is normal       Breath sounds: Normal breath sounds  Abdominal:      General: Abdomen is flat  Bowel sounds are normal       Palpations: Abdomen is soft  Musculoskeletal:         General: Normal range of motion  Cervical back: Normal range of motion and neck supple  Skin:     General: Skin is warm  Capillary Refill: Capillary refill takes less than 2 seconds  Neurological:      General: No focal deficit present  Mental Status: She is alert and oriented to person, place, and time     Psychiatric:         Mood and Affect: Mood normal          Behavior: Behavior normal

## 2023-02-28 ENCOUNTER — TELEPHONE (OUTPATIENT)
Dept: URGENT CARE | Facility: CLINIC | Age: 68
End: 2023-02-28

## 2023-02-28 ENCOUNTER — APPOINTMENT (OUTPATIENT)
Dept: RADIOLOGY | Facility: CLINIC | Age: 68
End: 2023-02-28

## 2023-02-28 ENCOUNTER — OFFICE VISIT (OUTPATIENT)
Dept: URGENT CARE | Facility: CLINIC | Age: 68
End: 2023-02-28

## 2023-02-28 VITALS
SYSTOLIC BLOOD PRESSURE: 138 MMHG | HEIGHT: 63 IN | HEART RATE: 62 BPM | OXYGEN SATURATION: 96 % | WEIGHT: 175 LBS | TEMPERATURE: 99.2 F | RESPIRATION RATE: 18 BRPM | BODY MASS INDEX: 31.01 KG/M2 | DIASTOLIC BLOOD PRESSURE: 80 MMHG

## 2023-02-28 DIAGNOSIS — R05.1 ACUTE COUGH: ICD-10-CM

## 2023-02-28 DIAGNOSIS — R05.1 ACUTE COUGH: Primary | ICD-10-CM

## 2023-02-28 RX ORDER — DOXYCYCLINE HYCLATE 100 MG/1
100 CAPSULE ORAL EVERY 12 HOURS SCHEDULED
Qty: 10 CAPSULE | Refills: 0 | Status: SHIPPED | OUTPATIENT
Start: 2023-02-28 | End: 2023-03-05

## 2023-02-28 RX ORDER — ALBUTEROL SULFATE 90 UG/1
AEROSOL, METERED RESPIRATORY (INHALATION)
COMMUNITY
Start: 2023-02-22

## 2023-02-28 NOTE — TELEPHONE ENCOUNTER
Chest Xray- 1  No acute cardiopulmonary disease      2  Prominent interstitial lung markings, which may relate to chronic interstitial lung disease      Patient was called and VM was left to call to go over

## 2023-02-28 NOTE — PATIENT INSTRUCTIONS
Patient was educated on possible pneumonia  Patient was prescribed antibiotics and told in great detail to eat on antibiotics  Follow up with PCP regarding chest xray  Patient was told I highly recommend going to ED due to shortness of breath and her extensive history  Pneumonia   WHAT YOU NEED TO KNOW:   Pneumonia is an infection in your lungs caused by bacteria, viruses, fungi, or parasites  You can become infected if you come in contact with someone who is sick  You can get pneumonia if you recently had surgery or needed a ventilator to help you breathe  Pneumonia can also be caused by accidentally inhaling saliva or small pieces of food  Pneumonia may cause mild symptoms, or it can be severe and life-threatening  DISCHARGE INSTRUCTIONS:   Return to the emergency department if:   You cough up blood  Your heart beats more than 100 beats in 1 minute  You are very tired, confused, and cannot think clearly  You have chest pain or trouble breathing  Your lips or fingernails turn gray or blue  Call your doctor if:   Your symptoms are the same or get worse 48 hours after you start antibiotics  Your fever is not below 99°F (37 2°C) 48 hours after you start antibiotics  You have a fever higher than 101°F (38 3°C)  You cannot eat, or you have loss of appetite, nausea, or are vomiting  You have questions or concerns about your condition or care  Medicines: You may need any of the following:  Antibiotics  treat pneumonia caused by bacteria  Acetaminophen  decreases pain and fever  It is available without a doctor's order  Ask how much to take and how often to take it  Follow directions  Read the labels of all other medicines you are using to see if they also contain acetaminophen, or ask your doctor or pharmacist  Acetaminophen can cause liver damage if not taken correctly  NSAIDs , such as ibuprofen, help decrease swelling, pain, and fever   This medicine is available with or without a doctor's order  NSAIDs can cause stomach bleeding or kidney problems in certain people  If you take blood thinner medicine, always ask your healthcare provider if NSAIDs are safe for you  Always read the medicine label and follow directions  Take your medicine as directed  Contact your healthcare provider if you think your medicine is not helping or if you have side effects  Tell your provider if you are allergic to any medicine  Keep a list of the medicines, vitamins, and herbs you take  Include the amounts, and when and why you take them  Bring the list or the pill bottles to follow-up visits  Carry your medicine list with you in case of an emergency  Manage your symptoms:   Rest as needed  Rest often throughout the day  Alternate times of activity with times of rest     Drink liquids as directed  Ask how much liquid to drink each day and which liquids are best for you  Liquids help thin your mucus, which may make it easier for you to cough it up  Do not smoke  Smoking increases your risk for pneumonia  Smoking also makes it harder for you to get better after you have had pneumonia  Ask your healthcare provider for information if you need help to quit smoking  Avoid secondhand smoke  Limit alcohol  Women should limit alcohol to 1 drink a day  Men should limit alcohol to 2 drinks a day  A drink of alcohol is 12 ounces of beer, 5 ounces of wine, or 1½ ounces of liquor  Use a cool mist humidifier  A humidifier will help increase air moisture in your home  This may make it easier for you to breathe and help decrease your cough  Keep your head elevated  You may be able to breathe better if you keep your head and upper body elevated  Prevent pneumonia:   Wash your hands often  Use soap and water  Wash for at least 20 seconds  Rinse with warm, running water for several seconds  Then dry your hands with a clean towel or paper towel   Use hand  that contains alcohol if soap and water are not available  Do not touch your eyes, nose, or mouth without washing your hands first          Cover a sneeze or cough  Use a tissue that covers your mouth and nose  Throw the tissue away in a trash can right away  Use the bend of your arm if a tissue is not available  Wash your hands well with soap and water or use a hand   Do not stand close to anyone who is sneezing or coughing  Stay away from others until you are well  Do not go to work or other activities  Wait until your symptoms are gone or your healthcare provider says it is okay to return  Ask about vaccines you may need  A pneumonia vaccine can help lower your risk for pneumonia  The vaccine may be recommended every 5 years, starting at age 72  Other vaccines help lower the risk for infections that can become serious for a person who has pneumonia  Get a flu vaccine each year as soon as recommended, usually in September or October  Get a COVID-19 vaccine and booster as directed  Your healthcare provider can tell you if you should also get other vaccines, and when to get them  Follow up with your doctor as directed: You will need to return for more tests  Write down your questions so you remember to ask them during your visits  © Copyright [facility] 2022 Information is for End User's use only and may not be sold, redistributed or otherwise used for commercial purposes  The above information is an  only  It is not intended as medical advice for individual conditions or treatments  Talk to your doctor, nurse or pharmacist before following any medical regimen to see if it is safe and effective for you

## 2023-02-28 NOTE — PROGRESS NOTES
[facility]        NAME: Jose Gilman is a 76 y o  female  : 1955    MRN: [id number]  DATE: 2023  TIME: 4:23 PM    Assessment and Plan   Acute cough [R05 1]  1  Acute cough  XR chest pa & lateral    doxycycline hyclate (VIBRAMYCIN) 100 mg capsule        Xray of chest- Possible pneumonia pending radiology report  Patient O2 with walking dropped to 90 and after sitting for awhile went up to 94  Patient is aware      Patient was educated I highly recommend going to ED due to extensive history  Patient was told she could have pneumonia, PE, and chest congestion  Patient was educated in great detail on PE  Declined EKG  Patient Instructions       Patient was educated on possible pneumonia  Patient was prescribed antibiotics and told in great detail to eat on antibiotics  Follow up with PCP regarding chest xray  Patient was told I highly recommend going to ED due to shortness of breath and her extensive history  Chief Complaint     Chief Complaint   Patient presents with   • Cough     Pt reports productive cough, chest/sinus congestion, and ear fullness with onset of symptoms five days ago with progression  C/o sob  States symptoms are not resolving with inhaler use  Hx Covid this month with resolution of symptoms  History of Present Illness       Patient is here today complaining of wheezing, PND, Cough that is wet, and sinus pressure for 5 days  Patient reports history of current breast cancer  History of COPD and chronic Bronchitis  Patient does use an inhaler  Patient reports pressure and chest tightness  Patient had COVID 19 beginning of 2023      Review of Systems   Review of Systems   Constitutional: Negative  HENT: Positive for congestion, ear pain, sinus pressure and sinus pain  Respiratory: Positive for cough and shortness of breath  Cardiovascular: Negative  Psychiatric/Behavioral: Negative            Current Medications       Current Outpatient Medications:   •  albuterol (PROVENTIL HFA,VENTOLIN HFA) 90 mcg/act inhaler, INHALE 2 PUFFS 4 TIMES A DAY, Disp: , Rfl:   •  doxycycline hyclate (VIBRAMYCIN) 100 mg capsule, Take 1 capsule (100 mg total) by mouth every 12 (twelve) hours for 5 days, Disp: 10 capsule, Rfl: 0  •  letrozole (FEMARA) 2 5 mg tablet, Take 2 5 mg by mouth daily, Disp: , Rfl:   •  lisinopril (ZESTRIL) 20 mg tablet, Take 20 mg by mouth daily, Disp: , Rfl:   •  NON FORMULARY, Medical marijuana, Disp: , Rfl:   •  omeprazole (PriLOSEC) 10 mg delayed release capsule, Take 10 mg by mouth daily, Disp: , Rfl:   •  PARoxetine (PAXIL) 20 mg tablet, Take 20 mg by mouth, Disp: , Rfl:   •  propranolol (INDERAL LA) 120 mg 24 hr capsule, Take 120 mg by mouth daily, Disp: , Rfl:   •  famotidine (PEPCID) 20 mg tablet, Take 20 mg by mouth 2 (two) times a day (Patient not taking: Reported on 1/31/2023), Disp: , Rfl:   •  pantoprazole (PROTONIX) 40 mg tablet, Take 1 tablet (40 mg total) by mouth daily, Disp: 30 tablet, Rfl: 5    Current Allergies     Allergies as of 02/28/2023 - Reviewed 02/28/2023   Allergen Reaction Noted   • Clindamycin Diarrhea 04/24/2020   • Erythromycin base Other (See Comments) 04/24/2020   • Metronidazole Swelling 04/24/2020   • Shellfish allergy - food allergy Vomiting 04/24/2020   • Amoxicillin Rash 04/24/2020            The following portions of the patient's history were reviewed and updated as appropriate: allergies, current medications, past family history, past medical history, past social history, past surgical history and problem list      Past Medical History:   Diagnosis Date   • Anxiety    • Breast cancer ([facility] [address] )    • Hyperlipidemia    • Hypertension        Past Surgical History:   Procedure Laterality Date   • BREAST LUMPECTOMY     • CHOLECYSTECTOMY     • COLONOSCOPY     • ERCP     • FOOT SURGERY     • LEG SURGERY     • TONSILLECTOMY         Family History   Problem Relation Age of Onset   • Heart disease Mother • Diabetes Mother    • Alcohol abuse Father    • Ulcerative colitis Brother    • Heart Valve Disease Brother    • Heart attack Brother    • Clotting disorder Brother    • Colon cancer Neg Hx          Medications have been verified  Objective   /80 (BP Location: Left arm, Patient Position: Sitting)   Pulse 62   Temp 99 2 °F (37 3 °C)   Resp 18   Ht 5' 3" (1 6 m)   Wt 79 4 kg (175 lb)   SpO2 96%   BMI 31 00 kg/m²   No LMP recorded  Patient is postmenopausal        Physical Exam     Physical Exam  Vitals and nursing note reviewed  Constitutional:       Appearance: Normal appearance  HENT:      Head: Normocephalic  Comments: NO current pressure over frontal or maxillary sinus     Right Ear: Tympanic membrane, ear canal and external ear normal       Left Ear: Tympanic membrane, ear canal and external ear normal       Nose: Nose normal       Mouth/Throat:      Mouth: Mucous membranes are moist    Eyes:      Extraocular Movements: Extraocular movements intact  Pupils: Pupils are equal, round, and reactive to light  Cardiovascular:      Rate and Rhythm: Regular rhythm  Pulmonary:      Breath sounds: Wheezing present  Neurological:      General: No focal deficit present  Mental Status: She is alert and oriented to person, place, and time     Psychiatric:         Mood and Affect: Mood normal          Behavior: Behavior normal

## 2023-04-03 ENCOUNTER — OFFICE VISIT (OUTPATIENT)
Dept: URGENT CARE | Facility: CLINIC | Age: 68
End: 2023-04-03

## 2023-04-03 VITALS
TEMPERATURE: 97.8 F | BODY MASS INDEX: 29.23 KG/M2 | OXYGEN SATURATION: 97 % | RESPIRATION RATE: 18 BRPM | HEIGHT: 63 IN | WEIGHT: 165 LBS | SYSTOLIC BLOOD PRESSURE: 138 MMHG | HEART RATE: 67 BPM | DIASTOLIC BLOOD PRESSURE: 70 MMHG

## 2023-04-03 DIAGNOSIS — J20.9 ACUTE BRONCHITIS, UNSPECIFIED ORGANISM: ICD-10-CM

## 2023-04-03 DIAGNOSIS — J01.90 ACUTE NON-RECURRENT SINUSITIS, UNSPECIFIED LOCATION: Primary | ICD-10-CM

## 2023-04-03 RX ORDER — AZITHROMYCIN 250 MG/1
TABLET, FILM COATED ORAL
Qty: 6 TABLET | Refills: 0 | Status: SHIPPED | OUTPATIENT
Start: 2023-04-03 | End: 2023-04-08

## 2023-04-03 RX ORDER — BENZONATATE 100 MG/1
100 CAPSULE ORAL 3 TIMES DAILY PRN
Qty: 20 CAPSULE | Refills: 0 | Status: SHIPPED | OUTPATIENT
Start: 2023-04-03

## 2023-04-03 RX ORDER — FLUTICASONE PROPIONATE 50 MCG
2 SPRAY, SUSPENSION (ML) NASAL DAILY
Qty: 16 G | Refills: 0 | Status: SHIPPED | OUTPATIENT
Start: 2023-04-03

## 2023-04-03 RX ORDER — PREDNISONE 10 MG/1
TABLET ORAL
Qty: 21 TABLET | Refills: 0 | Status: SHIPPED | OUTPATIENT
Start: 2023-04-03

## 2023-04-03 NOTE — PATIENT INSTRUCTIONS
Sinusitis   WHAT YOU NEED TO KNOW:   Sinusitis is inflammation or infection of your sinuses  Sinusitis is most often caused by a virus  Acute sinusitis may last up to 12 weeks  Chronic sinusitis lasts longer than 12 weeks  Recurrent sinusitis means you have 4 or more infections in 1 year  DISCHARGE INSTRUCTIONS:   Return to the emergency department if:   You have trouble breathing or wheezing that is getting worse  You have a stiff neck, a fever, or a bad headache  You cannot open your eye  Your eyeball bulges out or you cannot move your eye  You are more sleepy than normal, or you notice changes in your ability to think, move, or talk  You have swelling of your forehead or scalp  Call your doctor if:   You have vision changes, such as double vision  Your eye and eyelid are red, swollen, and painful  Your symptoms do not improve or go away after 10 days  You have nausea and are vomiting  Your nose is bleeding  You have questions or concerns about your condition or care  Medicines: Your symptoms may go away on their own  Your healthcare provider may recommend watchful waiting for up to 10 days before starting antibiotics  You may need any of the following:  Acetaminophen  decreases pain and fever  It is available without a doctor's order  Ask how much to take and how often to take it  Follow directions  Read the labels of all other medicines you are using to see if they also contain acetaminophen, or ask your doctor or pharmacist  Acetaminophen can cause liver damage if not taken correctly  NSAIDs , such as ibuprofen, help decrease swelling, pain, and fever  This medicine is available with or without a doctor's order  NSAIDs can cause stomach bleeding or kidney problems in certain people  If you take blood thinner medicine, always ask your healthcare provider if NSAIDs are safe for you  Always read the medicine label and follow directions      Nasal steroid sprays may help decrease inflammation in your nose and sinuses  Decongestants  help reduce swelling and drain mucus in the nose and sinuses  They may help you breathe easier  Antihistamines  help dry mucus in the nose and relieve sneezing  Antibiotics  help treat or prevent a bacterial infection  Take your medicine as directed  Contact your healthcare provider if you think your medicine is not helping or if you have side effects  Tell your provider if you are allergic to any medicine  Keep a list of the medicines, vitamins, and herbs you take  Include the amounts, and when and why you take them  Bring the list or the pill bottles to follow-up visits  Carry your medicine list with you in case of an emergency  Self-care:   Rinse your sinuses as directed  Use a sinus rinse device to rinse your nasal passages with a saline (salt water) solution or distilled water  Do not use tap water  This will help thin the mucus in your nose and rinse away pollen and dirt  It will also help reduce swelling so you can breathe normally  Use a humidifier  to increase air moisture in your home  This may make it easier for you to breathe and help decrease your cough  Sleep with your head elevated  Place an extra pillow under your head before you go to sleep to help your sinuses drain  Drink liquids as directed  Ask your healthcare provider how much liquid to drink each day and which liquids are best for you  Liquids will thin the mucus in your nose and help it drain  Avoid drinks that contain alcohol or caffeine  Do not smoke, and avoid secondhand smoke  Nicotine and other chemicals in cigarettes and cigars can make your symptoms worse  Ask your healthcare provider for information if you currently smoke and need help to quit  E-cigarettes or smokeless tobacco still contain nicotine  Talk to your healthcare provider before you use these products      Prevent the spread of germs:   Wash your hands often with soap and water  Wash your hands after you use the bathroom, change a child's diaper, or sneeze  Wash your hands before you prepare or eat food  Stay away from people who are sick  Some germs spread easily and quickly through contact  Follow up with your doctor as directed: You may be referred to an ear, nose, and throat specialist  Write down your questions so you remember to ask them during your visits  © Copyright [employer] 2022 Information is for End User's use only and may not be sold, redistributed or otherwise used for commercial purposes  The above information is an  only  It is not intended as medical advice for individual conditions or treatments  Talk to your doctor, nurse or pharmacist before following any medical regimen to see if it is safe and effective for you  Acute Bronchitis   WHAT YOU NEED TO KNOW:   Acute bronchitis is swelling and irritation in your lungs  It is usually caused by a virus and most often happens in the winter  Bronchitis may also be caused by bacteria or by a chemical irritant, such as smoke  DISCHARGE INSTRUCTIONS:   Return to the emergency department if:   You cough up blood  Your lips or fingernails turn blue  You feel like you are not getting enough air when you breathe  Call your doctor if:   Your symptoms do not go away or get worse, even after treatment  Your cough does not get better within 4 weeks  You have questions or concerns about your condition or care  Medicines: You may  need any of the following:  Cough suppressants  decrease your urge to cough  Decongestants  help loosen mucus in your lungs and make it easier to cough up  This can help you breathe easier  Inhalers  may be given  Your healthcare provider may give you one or more inhalers to help you breathe easier and cough less  An inhaler gives your medicine to open your airways   Ask your healthcare provider to show you how to use your inhaler correctly  Antibiotics  may be given for up to 5 days if your bronchitis is caused by bacteria  Acetaminophen  decreases pain and fever  It is available without a doctor's order  Ask how much to take and how often to take it  Follow directions  Read the labels of all other medicines you are using to see if they also contain acetaminophen, or ask your doctor or pharmacist  Acetaminophen can cause liver damage if not taken correctly  NSAIDs  help decrease swelling and pain or fever  This medicine is available with or without a doctor's order  NSAIDs can cause stomach bleeding or kidney problems in certain people  If you take blood thinner medicine, always ask your healthcare provider if NSAIDs are safe for you  Always read the medicine label and follow directions  Take your medicine as directed  Contact your healthcare provider if you think your medicine is not helping or if you have side effects  Tell your provider if you are allergic to any medicine  Keep a list of the medicines, vitamins, and herbs you take  Include the amounts, and when and why you take them  Bring the list or the pill bottles to follow-up visits  Carry your medicine list with you in case of an emergency  Self-care:   Drink liquids as directed  You may need to drink more liquids than usual to stay hydrated  Ask how much liquid to drink each day and which liquids are best for you  Use a cool mist humidifier  to increase air moisture in your home  This may make it easier for you to breathe and help decrease your cough  Get more rest   Rest helps your body to heal  Slowly start to do more each day  Rest when you feel it is needed  Avoid irritants in the air  Avoid chemicals, fumes, and dust  Wear a face mask if you must work around dust or fumes  Stay inside on days when air pollution levels are high  If you have allergies, stay inside when pollen counts are high   Do not use aerosol products, such as spray-on deodorant, bug spray, and hair spray  Do not smoke or be around others who are smoking  Nicotine and other chemicals in cigarettes and cigars can cause lung damage  Ask your healthcare provider for information if you currently smoke and need help to quit  E-cigarettes or smokeless tobacco still contain nicotine  Talk to your healthcare provider before you use these products  Prevent acute bronchitis:       Ask about vaccines you may need  Get a flu vaccine each year as soon as recommended, usually in September or October  Ask your healthcare provider if you should also get a pneumonia or COVID-19 vaccine  Your healthcare provider can tell you if you should also get other vaccines, and when to get them  Prevent the spread of germs  You can decrease your risk for acute bronchitis and other illnesses by doing the following:     Wash your hands often with soap and water  Carry germ-killing hand lotion or gel with you  You can use the lotion or gel to clean your hands when soap and water are not available  Do not touch your eyes, nose, or mouth unless you have washed your hands first     Always cover your mouth when you cough to prevent the spread of germs  It is best to cough into a tissue or your shirt sleeve instead of into your hand  Ask those around you to cover their mouths when they cough  Try to avoid people who have a cold or the flu  If you are sick, stay away from others as much as possible  Follow up with your doctor as directed:  Write down questions you have so you will remember to ask them during your follow-up visits  © Copyright Decl[employer] 2022 Information is for End User's use only and may not be sold, redistributed or otherwise used for commercial purposes  The above information is an  only  It is not intended as medical advice for individual conditions or treatments   Talk to your doctor, nurse or pharmacist before following any medical regimen to see if it is safe and effective for you

## 2023-04-06 NOTE — PROGRESS NOTES
[facility]        NAME: Jaylan Alves is a 76 y o  female  : 1955    MRN: [id number]  DATE: April 3, 2023  TIME: 3:09 PM    Assessment and Plan   Acute non-recurrent sinusitis, unspecified location [J01 90]  1  Acute non-recurrent sinusitis, unspecified location  benzonatate (TESSALON PERLES) 100 mg capsule    predniSONE 10 mg tablet    fluticasone (FLONASE) 50 mcg/act nasal spray    azithromycin (ZITHROMAX) 250 mg tablet      2  Acute bronchitis, unspecified organism  benzonatate (TESSALON PERLES) 100 mg capsule    predniSONE 10 mg tablet    azithromycin (ZITHROMAX) 250 mg tablet            Patient Instructions     Patient has sinusitis and bronchitis which I will treat with a combination of a Z-Devan, prednisone taper, Flonase, and Tessalon Perles and recommended hydration, rest, discussed OTC cough and cold meds, close observation  Follow up with PCP in 3-5 days  Proceed to  ER if symptoms worsen  Chief Complaint     Chief Complaint   Patient presents with   • Earache     Pt reports bilateral ear pain, post nasal drip, productive cough with green mucus production with onset of symptoms Thursday  States onset of fever Thursday night  Managing symptoms with coricidin last dose 03:00 am this morning  History of Present Illness       Presents with onset 4 days ago of congestion, PND, productive cough with green sputum, bilateral ear pain  Had fever at symptom onset  Denies shortness of breath or wheezing, N/V/D, recent COVID exposure  Has been taking Coricidin       Earache   Associated symptoms include coughing  Review of Systems   Review of Systems   Constitutional: Negative  HENT: Positive for congestion, ear pain and postnasal drip  Respiratory: Positive for cough  Negative for shortness of breath and wheezing  Cardiovascular: Negative  Gastrointestinal: Negative  Genitourinary: Negative            Current Medications       Current Outpatient Medications:   • albuterol (PROVENTIL HFA,VENTOLIN HFA) 90 mcg/act inhaler, INHALE 2 PUFFS 4 TIMES A DAY, Disp: , Rfl:   •  azithromycin (ZITHROMAX) 250 mg tablet, Take 2 tablets day 1 with food, then 1 tablet daily days 2-5 with food  , Disp: 6 tablet, Rfl: 0  •  benzonatate (TESSALON PERLES) 100 mg capsule, Take 1 capsule (100 mg total) by mouth 3 (three) times a day as needed for cough, Disp: 20 capsule, Rfl: 0  •  fluticasone (FLONASE) 50 mcg/act nasal spray, 2 sprays into each nostril daily, Disp: 16 g, Rfl: 0  •  letrozole (FEMARA) 2 5 mg tablet, Take 2 5 mg by mouth daily, Disp: , Rfl:   •  lisinopril (ZESTRIL) 20 mg tablet, Take 20 mg by mouth daily, Disp: , Rfl:   •  omeprazole (PriLOSEC) 10 mg delayed release capsule, Take 10 mg by mouth daily, Disp: , Rfl:   •  PARoxetine (PAXIL) 20 mg tablet, Take 20 mg by mouth, Disp: , Rfl:   •  predniSONE 10 mg tablet, 6-5-4-3-2-1 taper with food  , Disp: 21 tablet, Rfl: 0  •  propranolol (INDERAL LA) 120 mg 24 hr capsule, Take 120 mg by mouth daily, Disp: , Rfl:   •  famotidine (PEPCID) 20 mg tablet, Take 20 mg by mouth 2 (two) times a day (Patient not taking: Reported on 1/31/2023), Disp: , Rfl:   •  NON FORMULARY, Medical marijuana (Patient not taking: Reported on 4/3/2023), Disp: , Rfl:   •  pantoprazole (PROTONIX) 40 mg tablet, Take 1 tablet (40 mg total) by mouth daily, Disp: 30 tablet, Rfl: 5    Current Allergies     Allergies as of 04/03/2023 - Reviewed 04/03/2023   Allergen Reaction Noted   • Clindamycin Diarrhea 04/24/2020   • Erythromycin base Other (See Comments) 04/24/2020   • Metronidazole Swelling 04/24/2020   • Shellfish allergy - food allergy Vomiting 04/24/2020   • Amoxicillin Rash 04/24/2020            The following portions of the patient's history were reviewed and updated as appropriate: allergies, current medications, past family history, past medical history, past social history, past surgical history and problem list      Past Medical History:   Diagnosis Date "  • Anxiety    • Breast cancer ([facility] [address] )    • Hyperlipidemia    • Hypertension        Past Surgical History:   Procedure Laterality Date   • BREAST LUMPECTOMY     • CHOLECYSTECTOMY     • COLONOSCOPY     • ERCP     • FOOT SURGERY     • LEG SURGERY     • TONSILLECTOMY         Family History   Problem Relation Age of Onset   • Heart disease Mother    • Diabetes Mother    • Alcohol abuse Father    • Ulcerative colitis Brother    • Heart Valve Disease Brother    • Heart attack Brother    • Clotting disorder Brother    • Colon cancer Neg Hx          Medications have been verified  Objective   /70 (BP Location: Left arm, Patient Position: Sitting)   Pulse 67   Temp 97 8 °F (36 6 °C)   Resp 18   Ht 5' 3\" (1 6 m)   Wt 74 8 kg (165 lb)   SpO2 97%   BMI 29 23 kg/m²   No LMP recorded  Patient is postmenopausal        Physical Exam     Physical Exam  Vitals reviewed  Constitutional:       General: She is not in acute distress  Appearance: She is well-developed  HENT:      Right Ear: Hearing, tympanic membrane, ear canal and external ear normal       Left Ear: Hearing, tympanic membrane, ear canal and external ear normal       Nose: Mucosal edema (B/L boggy turbinates) and congestion present  Mouth/Throat:      Mouth: Mucous membranes are moist       Pharynx: Posterior oropharyngeal erythema (PND) present  No oropharyngeal exudate  Tonsils: No tonsillar exudate  Cardiovascular:      Rate and Rhythm: Normal rate and regular rhythm  Pulses: Normal pulses  Heart sounds: Normal heart sounds  No murmur heard  Pulmonary:      Effort: Pulmonary effort is normal  No respiratory distress  Breath sounds: Rhonchi (B/L diffuse coarse breath sounds heard throughout) present  Musculoskeletal:      Cervical back: Neck supple  Lymphadenopathy:      Cervical: No cervical adenopathy  Neurological:      Mental Status: She is alert and oriented to person, place, and time                     "

## 2023-04-20 PROBLEM — J20.9 ACUTE BRONCHITIS: Status: ACTIVE | Noted: 2023-04-20

## 2023-09-06 ENCOUNTER — CONSULT (OUTPATIENT)
Age: 68
End: 2023-09-06
Payer: COMMERCIAL

## 2023-09-06 VITALS
TEMPERATURE: 97.5 F | BODY MASS INDEX: 28.88 KG/M2 | SYSTOLIC BLOOD PRESSURE: 130 MMHG | WEIGHT: 163 LBS | OXYGEN SATURATION: 97 % | HEART RATE: 57 BPM | DIASTOLIC BLOOD PRESSURE: 80 MMHG | HEIGHT: 63 IN

## 2023-09-06 DIAGNOSIS — J84.10 PULMONARY FIBROSIS (HCC): Primary | ICD-10-CM

## 2023-09-06 PROCEDURE — 99204 OFFICE O/P NEW MOD 45 MIN: CPT | Performed by: INTERNAL MEDICINE

## 2023-09-06 NOTE — PROGRESS NOTES
Pulmonary Outpatient Note   Jeferson Bunch 76 y.o. female MRN: [id number]  9/7/2023      Referring Physician: Katherine Quintero DO    Reason for Consultation:    Chief Complaint   Patient presents with   • Interstitial Lung Disease     Assessment/Plan:  1. Pulmonary fibrosis [facility])  Assessment & Plan:  Patient has had evidence of pulmonary fibrosis on CT chests done at [facility] since 2019. Today I only have the CD from May 2023 and the report. There has been mentioning of mild progression of the degree of pulmonary fibrosis. In the report there is mention of minimal lingular bronchiectasis and no honeycomb formation. No obvious etiology from the medication, occupational, exposure standpoint. We will need to check rheumatologic serologies    We will need to acquire outside imaging from [address] [place] discussed pros and cons of antifibrotic therapy as well as potential side effects, especially GI side effects. Family history, mother had pulmonary fibrosis around age 61. May need genetic testing at some point. PFTs ordered    Follow-up in 2 to 3 months to discuss initiation of antifibrotics    Orders:  -     Complete PFT with post Bronchodilator and Six Minute walk; Future        Health Maintenance    There is no immunization history on file for this patient. Return in about 2 months (around 11/6/2023). History of Present Illness   HPI:  Jeferson Bunch is a 76 y.o. female who has a past medical history of breast cancer status post radiation and lumpectomy, on letrozole, hypertension, GERD, depression, who is presenting for the evaluation of interstitial lung disease. History of breast carcinoma s/p XRTlumpectomy with lymph node dissection with letrazole long term. She required a 2019 CT chest at [facility] for monitoring of lung parenchyma following radiation and that showed showed diffuse interstitial reticular findings. .  Repeat CT in 2020 showed progression.   We unfortunately do not have these imaging or reports at this time. She has a Chest CT from 5/11/2023 that shows moderate degree of subpleural reticulation noted throughout lung fields with mid and lower lung zone predominance. Minimal lingular bronchiectasis. No honeycomb formation. Compared to 11/2022 there is minimal progression in the mid lung zones compared with prior study. This finding of interstitial lung disease was not addressed in the past.  This is her first consultation with a pulmonologist.  She has no significant dyspnea at rest or with exertion. She has never needed oxygen. She does have a cough that is mostly nonproductive. Quit smoking in 2020. Smoked off and on for about 35 years total.  Still smokes medical marijuana but stopped in January. No lung disease growing up. She was a  at drug and ETOH treatment. 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 She smells the pool sometimes and that causes some cough. She has a dog. No birds (used to have a bird but that was in 2004). No feather/down comforters. No mold in the house. No water damage. She lives at a apartment building.   No hot tubs/jacuzzis    History of lichen planus, unexplained rash    Family hx of lung disease: mother had pulmonary fibrosis around age 62s  Joint swelling or pain: "body hurts all the time, back, neck, shoulders"  Raynaud's: no  Dry mouth/eyes: no  Rash: occasional rashes unexplained   Dysphagia/Reflux: yes, also has colitis  Leg swelling: no    Hobbies: noncontributory  Chemo/XRT: for breast cancer  TB risk factors: none  Medications, herbs, supplements: Omeprazole, Letrozole, Paxil, Lisinopril, Flonase, Albuterol inhaler, Inderal (for migraines, blood pressure) 120mg qday  Travel recently: no      Historical Information   Past Medical History:   Diagnosis Date   • Anxiety    • Breast cancer ([address])    • Hyperlipidemia    • Hypertension      Past Surgical History:   Procedure Laterality Date   • BREAST LUMPECTOMY     • CHOLECYSTECTOMY     • COLONOSCOPY     • ERCP     • FOOT SURGERY     • LEG SURGERY     • TONSILLECTOMY       Family History   Problem Relation Age of Onset   • Heart disease Mother    • Diabetes Mother    • Alcohol abuse Father    • Ulcerative colitis Brother    • Heart Valve Disease Brother    • Heart attack Brother    • Clotting disorder Brother    • Colon cancer Neg Hx        Meds/Allergies     Current Outpatient Medications:   •  albuterol (PROVENTIL HFA,VENTOLIN HFA) 90 mcg/act inhaler, INHALE 2 PUFFS 4 TIMES A DAY, Disp: , Rfl:   •  fluticasone (FLONASE) 50 mcg/act nasal spray, 2 sprays into each nostril daily, Disp: 16 g, Rfl: 0  •  letrozole (FEMARA) 2.5 mg tablet, Take 2.5 mg by mouth daily, Disp: , Rfl:   •  lisinopril (ZESTRIL) 20 mg tablet, Take 20 mg by mouth daily, Disp: , Rfl:   •  omeprazole (PriLOSEC) 10 mg delayed release capsule, Take 10 mg by mouth daily, Disp: , Rfl:   •  PARoxetine (PAXIL) 20 mg tablet, Take 20 mg by mouth, Disp: , Rfl:   •  propranolol (INDERAL LA) 120 mg 24 hr capsule, Take 120 mg by mouth daily, Disp: , Rfl:   •  benzonatate (TESSALON PERLES) 100 mg capsule, Take 1 capsule (100 mg total) by mouth 3 (three) times a day as needed for cough (Patient not taking: Reported on 9/6/2023), Disp: 20 capsule, Rfl: 0  •  famotidine (PEPCID) 20 mg tablet, Take 20 mg by mouth 2 (two) times a day (Patient not taking: Reported on 1/31/2023), Disp: , Rfl:   •  NON FORMULARY, Medical marijuana (Patient not taking: Reported on 4/3/2023), Disp: , Rfl:   •  pantoprazole (PROTONIX) 40 mg tablet, Take 1 tablet (40 mg total) by mouth daily (Patient not taking: Reported on 9/6/2023), Disp: 30 tablet, Rfl: 5  •  predniSONE 10 mg tablet, 6-5-4-3-2-1 taper with food.  (Patient not taking: Reported on 4/20/2023), Disp: 21 tablet, Rfl: 0  Allergies   Allergen Reactions   • Clindamycin Diarrhea     c-diff   • Erythromycin Base Other (See Comments)     Abd pain   • Metronidazole Swelling   • Shellfish Allergy - Food Allergy Vomiting   • Amoxicillin Rash       Vitals: Blood pressure 130/80, pulse 57, temperature 97.5 °F (36.4 °C), height 5' 3" (1.6 m), weight 73.9 kg (163 lb), SpO2 97 %. Body mass index is 28.87 kg/m². Oxygen Therapy  SpO2: 97 %  Oxygen Therapy: None (Room air)    Physical Exam  Vitals and nursing note reviewed. Constitutional:       General: She is not in acute distress. Appearance: She is well-developed. She is obese. She is not ill-appearing, toxic-appearing or diaphoretic. HENT:      Head: Normocephalic and atraumatic. Nose: Nose normal.      Mouth/Throat:      Mouth: Mucous membranes are moist.      Pharynx: Oropharynx is clear. No oropharyngeal exudate. Eyes:      General: No scleral icterus. Extraocular Movements: Extraocular movements intact. Conjunctiva/sclera: Conjunctivae normal.   Cardiovascular:      Rate and Rhythm: Normal rate and regular rhythm. Pulmonary:      Effort: Pulmonary effort is normal. No respiratory distress. Breath sounds: No stridor. Rales present. Abdominal:      General: There is no distension. Palpations: Abdomen is soft. Tenderness: There is no guarding. Musculoskeletal:         General: No swelling. Cervical back: Normal range of motion and neck supple. No rigidity. Right lower leg: No edema. Left lower leg: No edema. Skin:     General: Skin is warm and dry. Capillary Refill: Capillary refill takes less than 2 seconds. Coloration: Skin is not jaundiced. Comments: Digital clubbing bilateral hands   Neurological:      General: No focal deficit present. Mental Status: She is alert and oriented to person, place, and time. Mental status is at baseline. Psychiatric:         Mood and Affect: Mood normal.         Labs: I have personally reviewed pertinent lab results.     ABG: No results found for: "PHART", "AAA3NTZ", "PO2ART", "HBM5FKB", "H5ERQURT", "BEART", "SOURCE",   BNP: No results found for: "BNP",   CBC:  Lab Results   Component Value Date    WBC 7.6 08/23/2021    HGB 14.3 08/23/2021    HCT 42.7 08/23/2021    MCV 96 08/23/2021     08/23/2021    EOSPCT 6 08/23/2021    EOSABS 0.5 (H) 08/23/2021    NEUTOPHILPCT 58 08/23/2021    LYMPHOPCT 28 08/23/2021   ,   CMP:   Lab Results   Component Value Date    SODIUM 137 08/23/2021    K 4.3 08/23/2021     08/23/2021    CO2 23 08/23/2021    BUN 14 08/23/2021    CREATININE 1.20 (H) 08/23/2021    AST 26 08/23/2021    ALT 30 08/23/2021   ,   PT/INR: No results found for: "PT", "INR",   Troponin: No results found for: "TROPONINI"      Imaging and other studies: I have personally reviewed pertinent reports. and I have personally reviewed pertinent films in PACS  [facility] Chest CT from 5/11/2023 that shows moderate degree of subpleural reticulation noted throughout lung fields with mid and lower lung zone predominance. Minimal lingular bronchiectasis. No honeycomb formation. Compared to 11/2022 there is minimal progression in the mid lung zones compared with prior study. Fe Tinsley MD  Pulmonary, Critical Care and Sleep Medicine  [facility]     Portions of the record may have been created with voice recognition software. Occasional wrong word or "sound a like" substitutions may have occurred due to the inherent limitations of voice recognition software. Please read the chart carefully and recognize, using context, where substitutions have occurred.

## 2023-09-07 PROBLEM — J84.10 PULMONARY FIBROSIS (HCC): Status: ACTIVE | Noted: 2023-09-07

## 2023-09-07 NOTE — ASSESSMENT & PLAN NOTE
Patient has had evidence of pulmonary fibrosis on CT chests done at [facility] since 2019. Today I only have the CD from May 2023 and the report. There has been mentioning of mild progression of the degree of pulmonary fibrosis. In the report there is mention of minimal lingular bronchiectasis and no honeycomb formation. No obvious etiology from the medication, occupational, exposure standpoint. We will need to check rheumatologic serologies    We will need to acquire outside imaging from [address] [place] discussed pros and cons of antifibrotic therapy as well as potential side effects, especially GI side effects. Family history, mother had pulmonary fibrosis around age 61. May need genetic testing at some point.     PFTs ordered    Follow-up in 2 to 3 months to discuss initiation of antifibrotics

## 2023-09-12 ENCOUNTER — TELEPHONE (OUTPATIENT)
Dept: PULMONOLOGY | Facility: CLINIC | Age: 68
End: 2023-09-12

## 2023-09-12 ENCOUNTER — TELEPHONE (OUTPATIENT)
Age: 68
End: 2023-09-12

## 2023-09-12 NOTE — TELEPHONE ENCOUNTER
I reviewed a CT chest without contrast performed at [facility] on 5/11/2023    On my interpretation patient has subpleural reticular findings throughout both lungs more prominent in the anterior and mid lung distributions    There are some mild groundglass opacities but the predominant distribution is appears to be reticulations    There is some evidence of traction bronchiectasis    There is very little evidence of honeycombing, more prominent in the lower lobes and more prominent in the anterior region    Appears to be equal distribution between both lungs

## 2023-09-20 ENCOUNTER — HOSPITAL ENCOUNTER (OUTPATIENT)
Dept: PULMONOLOGY | Facility: HOSPITAL | Age: 68
Discharge: HOME | End: 2023-09-20
Payer: COMMERCIAL

## 2023-09-20 DIAGNOSIS — J84.10 PULMONARY FIBROSIS (HCC): ICD-10-CM

## 2023-09-20 PROCEDURE — 94060 EVALUATION OF WHEEZING: CPT | Performed by: INTERNAL MEDICINE

## 2023-09-20 PROCEDURE — 94726 PLETHYSMOGRAPHY LUNG VOLUMES: CPT

## 2023-09-20 PROCEDURE — 94761 N-INVAS EAR/PLS OXIMETRY MLT: CPT

## 2023-09-20 PROCEDURE — 94060 EVALUATION OF WHEEZING: CPT

## 2023-09-20 PROCEDURE — 94729 DIFFUSING CAPACITY: CPT | Performed by: INTERNAL MEDICINE

## 2023-09-20 PROCEDURE — 94618 PULMONARY STRESS TESTING: CPT | Performed by: INTERNAL MEDICINE

## 2023-09-20 PROCEDURE — 94726 PLETHYSMOGRAPHY LUNG VOLUMES: CPT | Performed by: INTERNAL MEDICINE

## 2023-09-20 PROCEDURE — 94729 DIFFUSING CAPACITY: CPT

## 2023-09-20 RX ORDER — ALBUTEROL SULFATE 2.5 MG/3ML
2.5 SOLUTION RESPIRATORY (INHALATION) ONCE
Status: COMPLETED | OUTPATIENT
Start: 2023-09-20 | End: 2023-09-20

## 2023-09-20 RX ADMIN — ALBUTEROL SULFATE 2.5 MG: 2.5 SOLUTION RESPIRATORY (INHALATION) at 14:01

## 2023-10-26 ENCOUNTER — TELEPHONE (OUTPATIENT)
Dept: PULMONOLOGY | Facility: CLINIC | Age: 68
End: 2023-10-26

## 2023-10-26 NOTE — TELEPHONE ENCOUNTER
Patient needs Pre-OP clearance:    Surgery: R Total Mastectomy, R axillary lymphatic mapping w/ radioisotope and lymphazrin injection, R sentinel lymph node biopsy     Surgery Date: 11/28/23  Surgeon: Alice Stevens  Last seen by Pulmonary: 09/06/2023  Supplemental O2: No   Sedation type: General Anesthesia   Length:  2 hours

## 2023-10-30 NOTE — TELEPHONE ENCOUNTER
Pt returned call, I scheduled her with Jessica Rhodes on 11/15 in the [facility] for her Pre Op Clearance.  Thank you

## 2023-11-15 ENCOUNTER — OFFICE VISIT (OUTPATIENT)
Dept: PULMONOLOGY | Facility: CLINIC | Age: 68
End: 2023-11-15
Payer: COMMERCIAL

## 2023-11-15 VITALS
OXYGEN SATURATION: 97 % | HEART RATE: 52 BPM | DIASTOLIC BLOOD PRESSURE: 80 MMHG | WEIGHT: 161 LBS | BODY MASS INDEX: 28.53 KG/M2 | TEMPERATURE: 97.3 F | SYSTOLIC BLOOD PRESSURE: 132 MMHG | HEIGHT: 63 IN

## 2023-11-15 DIAGNOSIS — Z01.818 PRE-OPERATIVE CLEARANCE: ICD-10-CM

## 2023-11-15 DIAGNOSIS — J84.10 PULMONARY FIBROSIS (HCC): Primary | ICD-10-CM

## 2023-11-15 PROCEDURE — 99214 OFFICE O/P EST MOD 30 MIN: CPT | Performed by: INTERNAL MEDICINE

## 2023-11-15 RX ORDER — AZITHROMYCIN 250 MG/1
250 TABLET, FILM COATED ORAL
COMMUNITY
Start: 2023-10-10

## 2023-11-15 NOTE — ASSESSMENT & PLAN NOTE
Patient has evidence of pulmonary fibrosis on CT since 2019. I have personally reviewed the CT chest uploaded by [employer] since 2019. There is some evidence of progression but no honeycomb formation. I am checking rheumatologic serologies    Situation is complicated by possible recurrent breast cancer which she is undergoing a mastectomy for. She may require chemoradiation depending on the diagnosis and Oncotype. I would like to put her on a antifibrotic in the future. We will revisit at the next visit after her mastectomy and recovery. Family history, mother had pulmonary fibrosis around age 61. May need genetic testing at some point.      She will need follow-up CT chest and pulmonary function testing annually    Follow-up in 3 months

## 2023-11-15 NOTE — ASSESSMENT & PLAN NOTE
Patient is undergoing preoperative pulmonary restratification for mastectomy for breast lump. She has a history of breast cancer in the past and this is for a suspicious breast nodule. She has a diagnosis of pulmonary fibrosis, which appears to be progressive over the past 3 years. Her pulmonary function testing shows at least moderate restriction with reduced DLCO. She did not require supplemental oxygen during a 6-minute walk test.  Her SPO2 in the office is 98%. Due to pulmonary fibrosis, her risk of postoperative respiratory complications such as hypoxia, pneumonia, respiratory failure requiring intensive care is at least moderate risk. However the benefits of a mastectomy right now outweigh any pulmonary risks and I see no absolute contraindication to proceed to surgery without delay. For pulmonary fibrosis, there are no acute interventions that would optimize her from a pulmonary standpoint. She should be monitored in the perioperative setting by anesthesia providers. She should be extubated by anesthesia providers in a monitored setting. Extra caution should be paid during intubation due to her pulmonary fibrosis due to decreased functional residual capacity    To reduce pulmonary complications in a postoperative setting, I recommend judicious use of pain medications to prevent atelectasis or oversedation. I recommend venothromboembolism prophylaxis as indicated by the surgery team.  I recommend being out of bed and ambulatory if possible after surgery. I recommend the use of incentive spirometry. She may need overnight monitoring if she has hypoxia after surgery.

## 2023-11-15 NOTE — PROGRESS NOTES
Pulmonary Outpatient Note   Tommie Diehl 76 y.o. female MRN: [id number]  11/15/2023      Referring Physician: Joana Mims DO    Reason for Consultation:    Chief Complaint   Patient presents with    Interstitial Lung Disease     Assessment/Plan:  1. Pulmonary fibrosis [facility])  Assessment & Plan:  Patient has evidence of pulmonary fibrosis on CT since 2019. I have personally reviewed the CT chest uploaded by [employer] since 2019. There is some evidence of progression but no honeycomb formation. I am checking rheumatologic serologies    Situation is complicated by possible recurrent breast cancer which she is undergoing a mastectomy for. She may require chemoradiation depending on the diagnosis and Oncotype. I would like to put her on a antifibrotic in the future. We will revisit at the next visit after her mastectomy and recovery. Family history, mother had pulmonary fibrosis around age 61. May need genetic testing at some point. She will need follow-up CT chest and pulmonary function testing annually    Follow-up in 3 months    Orders:  -     INTERSTITIAL LUNG DISEASE PANEL; Future  -     MACKENZIE Screen w/ Reflex to Titer/Pattern; Future  -     Aldolase; Future  -     Centromere Antibody; Future  -     Anti-Scl-70 Ab (RDL); Future  -     Anti-scleroderma antibody; Future  -     Rheumatoid Factor; Future  -     PM/SCL ANTIBODIES; Future  -     Cyclic citrul peptide antibody, IgG; Future  -     Creatine Kinase, Total; Future  -     Sjogren's Antibodies; Future  -     Hypersensitivity pnuemonitis profile; Future  -     Aldolase  -     Centromere Antibody  -     Anti-Scl-70 Ab (RDL)  -     Anti-scleroderma antibody  -     PM/SCL ANTIBODIES  -     Sjogren's Antibodies  -     Hypersensitivity pnuemonitis profile    2. Pre-operative clearance  Assessment & Plan:  Patient is undergoing preoperative pulmonary restratification for mastectomy for breast lump.   She has a history of breast cancer in the past and this is for a suspicious breast nodule. She has a diagnosis of pulmonary fibrosis, which appears to be progressive over the past 3 years. Her pulmonary function testing shows at least moderate restriction with reduced DLCO. She did not require supplemental oxygen during a 6-minute walk test.  Her SPO2 in the office is 98%. Due to pulmonary fibrosis, her risk of postoperative respiratory complications such as hypoxia, pneumonia, respiratory failure requiring intensive care is at least moderate risk. However the benefits of a mastectomy right now outweigh any pulmonary risks and I see no absolute contraindication to proceed to surgery without delay. For pulmonary fibrosis, there are no acute interventions that would optimize her from a pulmonary standpoint. She should be monitored in the perioperative setting by anesthesia providers. She should be extubated by anesthesia providers in a monitored setting. Extra caution should be paid during intubation due to her pulmonary fibrosis due to decreased functional residual capacity    To reduce pulmonary complications in a postoperative setting, I recommend judicious use of pain medications to prevent atelectasis or oversedation. I recommend venothromboembolism prophylaxis as indicated by the surgery team.  I recommend being out of bed and ambulatory if possible after surgery. I recommend the use of incentive spirometry. She may need overnight monitoring if she has hypoxia after surgery. Health Maintenance    There is no immunization history on file for this patient. Return in about 3 months (around 2/15/2024).     History of Present Illness   HPI:  Bill Gant is a 76 y.o. female who has a past medical history of breast cancer status post radiation and lumpectomy, on letrozole, hypertension, GERD, depression, who is presenting for the follow-up of interstitial lung disease and preoperative pulmonary evaluation before mastectomy    Last visit with me 9/12/23  In the interim has been found to have a recurrent breast nodule and she is undergoing mastectomy on November 28 at [facility]    She has no new respiratory symptoms. She still has shortness of breath with exertion. No hemoptysis, weight loss. Pulmonary History:  History of breast carcinoma s/p XRTlumpectomy with lymph node dissection with letrazole long term. She required a 2019 CT chest at [facility] for monitoring of lung parenchyma following radiation and that showed showed diffuse interstitial reticular findings. .  Repeat CT in 2020 showed progression. We unfortunately do not have these imaging or reports at this time. She has a Chest CT from 5/11/2023 that shows moderate degree of subpleural reticulation noted throughout lung fields with mid and lower lung zone predominance. Minimal lingular bronchiectasis. No honeycomb formation. Compared to 11/2022 there is minimal progression in the mid lung zones compared with prior study. This finding of interstitial lung disease was not addressed in the past.  This is her first consultation with a pulmonologist.  She has no significant dyspnea at rest or with exertion. She has never needed oxygen. She does have a cough that is mostly nonproductive. Quit smoking in 2020. Smoked off and on for about 35 years total.  Still smokes medical marijuana but stopped in January. No lung disease growing up. She was a  at drug and ETOH treatment. She works at  part-time at [employer]. She smells the pool sometimes and that causes some cough. She has a dog. No birds (used to have a bird but that was in 2004). No feather/down comforters. No mold in the house. No water damage. She lives at a apartment building.   No hot tubs/jacuzzis    History of lichen planus, unexplained rash    Family hx of lung disease: mother had pulmonary fibrosis around age 62s  Joint swelling or pain: "body hurts all the time, back, neck, shoulders"  Raynaud's: no  Dry mouth/eyes: no  Rash: occasional rashes unexplained   Dysphagia/Reflux: yes, also has colitis  Leg swelling: no    Hobbies: noncontributory  Chemo/XRT: for breast cancer  TB risk factors: none  Medications, herbs, supplements: Omeprazole, Letrozole, Paxil, Lisinopril, Flonase, Albuterol inhaler, Inderal (for migraines, blood pressure) 120mg qday  Travel recently: no      Historical Information   Past Medical History:   Diagnosis Date    Anxiety     Breast cancer ([address])     Hyperlipidemia     Hypertension      Past Surgical History:   Procedure Laterality Date    BREAST LUMPECTOMY      CHOLECYSTECTOMY      COLONOSCOPY      ERCP      FOOT SURGERY      LEG SURGERY      TONSILLECTOMY       Family History   Problem Relation Age of Onset    Heart disease Mother     Diabetes Mother     Alcohol abuse Father     Ulcerative colitis Brother     Heart Valve Disease Brother     Heart attack Brother     Clotting disorder Brother     Colon cancer Neg Hx        Meds/Allergies     Current Outpatient Medications:     albuterol (PROVENTIL HFA,VENTOLIN HFA) 90 mcg/act inhaler, INHALE 2 PUFFS 4 TIMES A DAY, Disp: , Rfl:     azithromycin (ZITHROMAX) 250 mg tablet, Take 250 mg by mouth, Disp: , Rfl:     fluticasone (FLONASE) 50 mcg/act nasal spray, 2 sprays into each nostril daily, Disp: 16 g, Rfl: 0    letrozole (FEMARA) 2.5 mg tablet, Take 2.5 mg by mouth daily, Disp: , Rfl:     lisinopril (ZESTRIL) 20 mg tablet, Take 20 mg by mouth daily, Disp: , Rfl:     omeprazole (PriLOSEC) 10 mg delayed release capsule, Take 10 mg by mouth daily, Disp: , Rfl:     PARoxetine (PAXIL) 20 mg tablet, Take 20 mg by mouth, Disp: , Rfl:     propranolol (INDERAL LA) 120 mg 24 hr capsule, Take 120 mg by mouth daily, Disp: , Rfl:     benzonatate (TESSALON PERLES) 100 mg capsule, Take 1 capsule (100 mg total) by mouth 3 (three) times a day as needed for cough (Patient not taking: Reported on 9/6/2023), Disp: 20 capsule, Rfl: 0    famotidine (PEPCID) 20 mg tablet, Take 20 mg by mouth 2 (two) times a day (Patient not taking: Reported on 1/31/2023), Disp: , Rfl:     NON FORMULARY, Medical marijuana (Patient not taking: Reported on 4/3/2023), Disp: , Rfl:     pantoprazole (PROTONIX) 40 mg tablet, Take 1 tablet (40 mg total) by mouth daily (Patient not taking: Reported on 9/6/2023), Disp: 30 tablet, Rfl: 5    predniSONE 10 mg tablet, 6-5-4-3-2-1 taper with food. (Patient not taking: Reported on 4/20/2023), Disp: 21 tablet, Rfl: 0  Allergies   Allergen Reactions    Clindamycin Diarrhea     c-diff    Erythromycin Base Other (See Comments)     Abd pain    Metronidazole Swelling    Shellfish Allergy - Food Allergy Vomiting    Amoxicillin Rash       Vitals: Blood pressure 132/80, pulse (!) 52, temperature (!) 97.3 °F (36.3 °C), temperature source Tympanic, height 5' 3" (1.6 m), weight 73 kg (161 lb), SpO2 97 %. Body mass index is 28.52 kg/m². Oxygen Therapy  SpO2: 97 %  Oxygen Therapy: None (Room air)    Physical Exam  Vitals and nursing note reviewed. Constitutional:       General: She is not in acute distress. Appearance: She is well-developed. She is obese. She is not ill-appearing, toxic-appearing or diaphoretic. HENT:      Head: Normocephalic and atraumatic. Nose: Nose normal.      Mouth/Throat:      Mouth: Mucous membranes are moist.      Pharynx: Oropharynx is clear. No oropharyngeal exudate. Eyes:      General: No scleral icterus. Extraocular Movements: Extraocular movements intact. Conjunctiva/sclera: Conjunctivae normal.   Cardiovascular:      Rate and Rhythm: Normal rate and regular rhythm. Pulmonary:      Effort: Pulmonary effort is normal. No respiratory distress. Breath sounds: No stridor. Rales present. Abdominal:      General: There is no distension. Palpations: Abdomen is soft. Tenderness: There is no guarding.    Musculoskeletal:         General: No swelling. Cervical back: Normal range of motion and neck supple. No rigidity. Right lower leg: No edema. Left lower leg: No edema. Skin:     General: Skin is warm and dry. Capillary Refill: Capillary refill takes less than 2 seconds. Coloration: Skin is not jaundiced. Comments: Digital clubbing bilateral hands   Neurological:      General: No focal deficit present. Mental Status: She is alert and oriented to person, place, and time. Mental status is at baseline. Psychiatric:         Mood and Affect: Mood normal.         Labs: I have personally reviewed pertinent lab results. ABG: No results found for: "PHART", "MDB6UJO", "PO2ART", "EFN4ZXE", "K7YFCPWI", "BEART", "SOURCE",   BNP: No results found for: "BNP",   CBC:  Lab Results   Component Value Date    WBC 7.6 08/23/2021    HGB 14.3 08/23/2021    HCT 42.7 08/23/2021    MCV 96 08/23/2021     08/23/2021    EOSPCT 6 08/23/2021    EOSABS 0.5 (H) 08/23/2021    NEUTOPHILPCT 58 08/23/2021    LYMPHOPCT 28 08/23/2021   ,   CMP:   Lab Results   Component Value Date    SODIUM 137 08/23/2021    K 4.3 08/23/2021     08/23/2021    CO2 23 08/23/2021    BUN 14 08/23/2021    CREATININE 1.20 (H) 08/23/2021    AST 26 08/23/2021    ALT 30 08/23/2021   ,   PT/INR: No results found for: "PT", "INR",   Troponin: No results found for: "TROPONINI"      Imaging and other studies: I have personally reviewed pertinent reports. and I have personally reviewed pertinent films in PACS  [facility] Chest CT from 5/11/2023 that shows moderate degree of subpleural reticulation noted throughout lung fields with mid and lower lung zone predominance. Minimal lingular bronchiectasis. No honeycomb formation. Compared to 11/2022 there is minimal progression in the mid lung zones compared with prior study.     Pulmonary Function Testing  Results:  FEV1/FVC Ratio: 87 %  Forced Vital Capacity: 2.07 L    73 % predicted  FEV1: 1.81 L     82 % predicted Lung volumes by body plethysmography:   Total Lung Capacity 64 % predicted   Residual volume 66 % predicted     DLCO corrected for patients hemoglobin level: 31 %     Resting room air saturation: 94%  Resting room air heart rate: 56 bpm  Abdelrahman scale of dyspnea at start of test: slight/10     Ambulation testing:   Lowest saturation: 90%  Heart rate: 69 bpm  Supplemental oxygen: none     Abdelrahman scale of dyspnea at end of test: 2/10  Total stops: 0  Reason if test was stopped early: N/A   Heart rate at the end of testin bpm     Total 6 minute walk distance: 226 meters     Six minute walk test demonstrates lowest saturation 90%, no supplemental oxygen required with ambulation, total distance walked 226 meters. Edgar Chinchilla MD  Pulmonary, Critical Care and Sleep Medicine  [address] [facility]     Portions of the record may have been created with voice recognition software. Occasional wrong word or "sound a like" substitutions may have occurred due to the inherent limitations of voice recognition software. Please read the chart carefully and recognize, using context, where substitutions have occurred.

## 2024-04-24 ENCOUNTER — TELEPHONE (OUTPATIENT)
Dept: PULMONOLOGY | Facility: CLINIC | Age: 69
End: 2024-04-24

## 2024-05-01 ENCOUNTER — TELEPHONE (OUTPATIENT)
Age: 69
End: 2024-05-01

## 2024-05-01 NOTE — TELEPHONE ENCOUNTER
Patients daughter called the RX Refill Line. Message is being forwarded to the office.         Patients daughter called to let the office know that her mom passed away in January and she will not be coming to her appointment and if they could stop calling her.